# Patient Record
Sex: FEMALE | URBAN - METROPOLITAN AREA
[De-identification: names, ages, dates, MRNs, and addresses within clinical notes are randomized per-mention and may not be internally consistent; named-entity substitution may affect disease eponyms.]

---

## 2017-01-02 ENCOUNTER — EMERGENCY (EMERGENCY)
Facility: HOSPITAL | Age: 67
LOS: 1 days | Discharge: HOME | End: 2017-01-02
Attending: EMERGENCY MEDICINE
Payer: MEDICARE

## 2017-01-02 VITALS
DIASTOLIC BLOOD PRESSURE: 62 MMHG | RESPIRATION RATE: 16 BRPM | OXYGEN SATURATION: 97 % | SYSTOLIC BLOOD PRESSURE: 99 MMHG | TEMPERATURE: 98 F | HEIGHT: 64 IN | WEIGHT: 139.99 LBS | HEART RATE: 74 BPM

## 2017-01-02 DIAGNOSIS — S61.412A LACERATION WITHOUT FOREIGN BODY OF LEFT HAND, INITIAL ENCOUNTER: ICD-10-CM
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
## 2017-01-02 DIAGNOSIS — E78.00 PURE HYPERCHOLESTEROLEMIA, UNSPECIFIED: ICD-10-CM

## 2017-01-02 DIAGNOSIS — Z85.07 PERSONAL HISTORY OF MALIGNANT NEOPLASM OF PANCREAS: ICD-10-CM
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
## 2017-01-02 DIAGNOSIS — Z98.890 OTHER SPECIFIED POSTPROCEDURAL STATES: Chronic | ICD-10-CM

## 2017-01-02 DIAGNOSIS — Z87.19 PERSONAL HISTORY OF OTHER DISEASES OF THE DIGESTIVE SYSTEM: Chronic | ICD-10-CM

## 2017-01-02 DIAGNOSIS — E11.9 TYPE 2 DIABETES MELLITUS WITHOUT COMPLICATIONS: ICD-10-CM

## 2017-01-02 DIAGNOSIS — I10 ESSENTIAL (PRIMARY) HYPERTENSION: ICD-10-CM

## 2017-01-02 LAB
ALBUMIN SERPL ELPH-MCNC: 3.1 G/DL — LOW (ref 3.5–5)
ALP SERPL-CCNC: 152 U/L — HIGH (ref 40–120)
ALT FLD-CCNC: 29 U/L DA — SIGNIFICANT CHANGE UP (ref 10–60)
ANION GAP SERPL CALC-SCNC: 8 MMOL/L — SIGNIFICANT CHANGE UP (ref 5–17)
AST SERPL-CCNC: 14 U/L — SIGNIFICANT CHANGE UP (ref 10–40)
BILIRUB SERPL-MCNC: 0.2 MG/DL — SIGNIFICANT CHANGE UP (ref 0.2–1.2)
BUN SERPL-MCNC: 18 MG/DL — SIGNIFICANT CHANGE UP (ref 7–18)
CALCIUM SERPL-MCNC: 8.6 MG/DL — SIGNIFICANT CHANGE UP (ref 8.4–10.5)
CHLORIDE SERPL-SCNC: 109 MMOL/L — HIGH (ref 96–108)
CO2 SERPL-SCNC: 24 MMOL/L — SIGNIFICANT CHANGE UP (ref 22–31)
CREAT SERPL-MCNC: 1 MG/DL — SIGNIFICANT CHANGE UP (ref 0.5–1.3)
GLUCOSE SERPL-MCNC: 212 MG/DL — HIGH (ref 70–99)
HCT VFR BLD CALC: 34 % — LOW (ref 34.5–45)
HGB BLD-MCNC: 11.3 G/DL — LOW (ref 11.5–15.5)
MCHC RBC-ENTMCNC: 30 PG — SIGNIFICANT CHANGE UP (ref 27–34)
MCHC RBC-ENTMCNC: 33.2 GM/DL — SIGNIFICANT CHANGE UP (ref 32–36)
MCV RBC AUTO: 90.4 FL — SIGNIFICANT CHANGE UP (ref 80–100)
PLATELET # BLD AUTO: 204 K/UL — SIGNIFICANT CHANGE UP (ref 150–400)
POTASSIUM SERPL-MCNC: 4.6 MMOL/L — SIGNIFICANT CHANGE UP (ref 3.5–5.3)
POTASSIUM SERPL-SCNC: 4.6 MMOL/L — SIGNIFICANT CHANGE UP (ref 3.5–5.3)
PROT SERPL-MCNC: 6.4 G/DL — SIGNIFICANT CHANGE UP (ref 6–8.3)
RBC # BLD: 3.76 M/UL — LOW (ref 3.8–5.2)
RBC # FLD: 12.3 % — SIGNIFICANT CHANGE UP (ref 10.3–14.5)
SODIUM SERPL-SCNC: 141 MMOL/L — SIGNIFICANT CHANGE UP (ref 135–145)
WBC # BLD: 6.7 K/UL — SIGNIFICANT CHANGE UP (ref 3.8–10.5)
WBC # FLD AUTO: 6.7 K/UL — SIGNIFICANT CHANGE UP (ref 3.8–10.5)

## 2017-01-02 PROCEDURE — 85027 COMPLETE CBC AUTOMATED: CPT

## 2017-01-02 PROCEDURE — 36415 COLL VENOUS BLD VENIPUNCTURE: CPT

## 2017-01-02 PROCEDURE — 99284 EMERGENCY DEPT VISIT MOD MDM: CPT

## 2017-01-02 PROCEDURE — 80053 COMPREHEN METABOLIC PANEL: CPT

## 2017-01-02 PROCEDURE — 99283 EMERGENCY DEPT VISIT LOW MDM: CPT

## 2017-01-02 RX ORDER — CEPHALEXIN 500 MG
1 CAPSULE ORAL
Qty: 40 | Refills: 0 | OUTPATIENT
Start: 2017-01-02 | End: 2017-01-12

## 2017-01-02 NOTE — ED PROVIDER NOTE - NS ED MD SCRIBE ATTENDING SCRIBE SECTIONS
DISPOSITION/PAST MEDICAL/SURGICAL/SOCIAL HISTORY/REVIEW OF SYSTEMS/HISTORY OF PRESENT ILLNESS/PHYSICAL EXAM/VITAL SIGNS( Pullset)

## 2017-01-02 NOTE — ED PROVIDER NOTE - PROGRESS NOTE DETAILS
D/w pt need for delayed repair of wound given delayed presentation to ED. Pt understands, d/w pt relative who is physician in [place], aware of plan. f/u Hand referral, prophylactic abx.

## 2017-01-02 NOTE — ED ADULT NURSE NOTE - OBJECTIVE STATEMENT
axox3 ambulatory sustained bruising and laceration to Lt hand  onset yesterday no active bleeding noted last Tetanus UNKN

## 2017-01-02 NOTE — ED PROVIDER NOTE - PHYSICAL EXAMINATION
Skin: 2cm L thenar laceration Skin: 2cm L thenar laceration wound explored, no tendon involvement, fds/fdp/et intact left digits, cap refill <2

## 2017-01-02 NOTE — ED PROVIDER NOTE - OBJECTIVE STATEMENT
65 y/o F pt w/ PMHx of DM, pancreatic cancer and low blood pressure presents to the ED c/o laceration to L hand yesterday. Pt states that she was in the kitchen when she cut herself. Pt was bleeding profusely and associates weakness secondary to symptoms. Denies numbness/tingling, weakness or any other complaints. NKDA. 67 y/o F pt w/ PMHx of DM, pancreatic cancer and low blood pressure presents to the ED c/o laceration to L hand yesterday. Pt states that she was in the kitchen when she cut herself. Pt states cut was spurting blood yesterday evening. Denies numbness/tingling, weakness or any other complaints. NKDA.

## 2017-02-16 ENCOUNTER — APPOINTMENT (OUTPATIENT)
Dept: OPHTHALMOLOGY | Facility: CLINIC | Age: 67
End: 2017-02-16

## 2017-04-18 ENCOUNTER — RX RENEWAL (OUTPATIENT)
Age: 67
End: 2017-04-18

## 2017-04-20 ENCOUNTER — APPOINTMENT (OUTPATIENT)
Dept: OPHTHALMOLOGY | Facility: CLINIC | Age: 67
End: 2017-04-20

## 2017-05-18 ENCOUNTER — APPOINTMENT (OUTPATIENT)
Dept: OPHTHALMOLOGY | Facility: CLINIC | Age: 67
End: 2017-05-18

## 2017-06-15 ENCOUNTER — RX RENEWAL (OUTPATIENT)
Age: 67
End: 2017-06-15

## 2017-06-27 ENCOUNTER — APPOINTMENT (OUTPATIENT)
Dept: OPHTHALMOLOGY | Facility: CLINIC | Age: 67
End: 2017-06-27

## 2017-10-19 ENCOUNTER — APPOINTMENT (OUTPATIENT)
Dept: OPHTHALMOLOGY | Facility: CLINIC | Age: 67
End: 2017-10-19
Payer: MEDICARE

## 2017-10-19 PROCEDURE — 92012 INTRM OPH EXAM EST PATIENT: CPT

## 2018-03-14 ENCOUNTER — RX RENEWAL (OUTPATIENT)
Age: 68
End: 2018-03-14

## 2018-04-19 ENCOUNTER — APPOINTMENT (OUTPATIENT)
Dept: OPHTHALMOLOGY | Facility: CLINIC | Age: 68
End: 2018-04-19
Payer: MEDICARE

## 2018-04-19 PROCEDURE — 92012 INTRM OPH EXAM EST PATIENT: CPT

## 2018-10-18 ENCOUNTER — APPOINTMENT (OUTPATIENT)
Dept: OPHTHALMOLOGY | Facility: CLINIC | Age: 68
End: 2018-10-18
Payer: MEDICARE

## 2018-10-18 DIAGNOSIS — H10.45 OTHER CHRONIC ALLERGIC CONJUNCTIVITIS: ICD-10-CM

## 2018-10-18 DIAGNOSIS — Z96.1 PRESENCE OF INTRAOCULAR LENS: ICD-10-CM

## 2018-10-18 DIAGNOSIS — H20.023 RECURRENT ACUTE IRIDOCYCLITIS, BILATERAL: ICD-10-CM

## 2018-10-18 PROCEDURE — 92014 COMPRE OPH EXAM EST PT 1/>: CPT

## 2018-12-18 ENCOUNTER — INPATIENT (INPATIENT)
Facility: HOSPITAL | Age: 68
LOS: 2 days | Discharge: HOME | DRG: 390 | End: 2018-12-21
Attending: SURGERY | Admitting: SURGERY
Payer: MEDICARE

## 2018-12-18 VITALS
TEMPERATURE: 98 F | SYSTOLIC BLOOD PRESSURE: 150 MMHG | HEIGHT: 64 IN | WEIGHT: 151.9 LBS | HEART RATE: 65 BPM | RESPIRATION RATE: 16 BRPM | DIASTOLIC BLOOD PRESSURE: 97 MMHG | OXYGEN SATURATION: 98 %

## 2018-12-18 DIAGNOSIS — K56.609 UNSPECIFIED INTESTINAL OBSTRUCTION, UNSPECIFIED AS TO PARTIAL VERSUS COMPLETE OBSTRUCTION: ICD-10-CM

## 2018-12-18 DIAGNOSIS — Z98.890 OTHER SPECIFIED POSTPROCEDURAL STATES: Chronic | ICD-10-CM

## 2018-12-18 DIAGNOSIS — Z87.19 PERSONAL HISTORY OF OTHER DISEASES OF THE DIGESTIVE SYSTEM: Chronic | ICD-10-CM

## 2018-12-18 LAB
ALBUMIN SERPL ELPH-MCNC: 3.6 G/DL — SIGNIFICANT CHANGE UP (ref 3.5–5)
ALP SERPL-CCNC: 235 U/L — HIGH (ref 40–120)
ALT FLD-CCNC: 44 U/L DA — SIGNIFICANT CHANGE UP (ref 10–60)
ANION GAP SERPL CALC-SCNC: 12 MMOL/L — SIGNIFICANT CHANGE UP (ref 5–17)
APPEARANCE UR: CLEAR — SIGNIFICANT CHANGE UP
APTT BLD: 29.7 SEC — SIGNIFICANT CHANGE UP (ref 27.5–36.3)
AST SERPL-CCNC: 42 U/L — HIGH (ref 10–40)
BASOPHILS # BLD AUTO: 0.1 K/UL — SIGNIFICANT CHANGE UP (ref 0–0.2)
BASOPHILS NFR BLD AUTO: 0.6 % — SIGNIFICANT CHANGE UP (ref 0–2)
BILIRUB SERPL-MCNC: 0.6 MG/DL — SIGNIFICANT CHANGE UP (ref 0.2–1.2)
BILIRUB UR-MCNC: NEGATIVE — SIGNIFICANT CHANGE UP
BUN SERPL-MCNC: 17 MG/DL — SIGNIFICANT CHANGE UP (ref 7–18)
CALCIUM SERPL-MCNC: 8.8 MG/DL — SIGNIFICANT CHANGE UP (ref 8.4–10.5)
CHLORIDE SERPL-SCNC: 106 MMOL/L — SIGNIFICANT CHANGE UP (ref 96–108)
CO2 SERPL-SCNC: 21 MMOL/L — LOW (ref 22–31)
COLOR SPEC: YELLOW — SIGNIFICANT CHANGE UP
CREAT SERPL-MCNC: 0.92 MG/DL — SIGNIFICANT CHANGE UP (ref 0.5–1.3)
DIFF PNL FLD: NEGATIVE — SIGNIFICANT CHANGE UP
EOSINOPHIL # BLD AUTO: 0 K/UL — SIGNIFICANT CHANGE UP (ref 0–0.5)
EOSINOPHIL NFR BLD AUTO: 0.2 % — SIGNIFICANT CHANGE UP (ref 0–6)
GLUCOSE BLDC GLUCOMTR-MCNC: 128 MG/DL — HIGH (ref 70–99)
GLUCOSE BLDC GLUCOMTR-MCNC: 177 MG/DL — HIGH (ref 70–99)
GLUCOSE SERPL-MCNC: 172 MG/DL — HIGH (ref 70–99)
GLUCOSE UR QL: NEGATIVE — SIGNIFICANT CHANGE UP
HCT VFR BLD CALC: 38.2 % — SIGNIFICANT CHANGE UP (ref 34.5–45)
HGB BLD-MCNC: 12.1 G/DL — SIGNIFICANT CHANGE UP (ref 11.5–15.5)
INR BLD: 1.03 RATIO — SIGNIFICANT CHANGE UP (ref 0.88–1.16)
KETONES UR-MCNC: ABNORMAL
LEUKOCYTE ESTERASE UR-ACNC: NEGATIVE — SIGNIFICANT CHANGE UP
LIDOCAIN IGE QN: 30 U/L — LOW (ref 73–393)
LYMPHOCYTES # BLD AUTO: 1.5 K/UL — SIGNIFICANT CHANGE UP (ref 1–3.3)
LYMPHOCYTES # BLD AUTO: 15.8 % — SIGNIFICANT CHANGE UP (ref 13–44)
MCHC RBC-ENTMCNC: 26.6 PG — LOW (ref 27–34)
MCHC RBC-ENTMCNC: 31.7 GM/DL — LOW (ref 32–36)
MCV RBC AUTO: 83.7 FL — SIGNIFICANT CHANGE UP (ref 80–100)
MONOCYTES # BLD AUTO: 0.5 K/UL — SIGNIFICANT CHANGE UP (ref 0–0.9)
MONOCYTES NFR BLD AUTO: 5.2 % — SIGNIFICANT CHANGE UP (ref 2–14)
NEUTROPHILS # BLD AUTO: 7.4 K/UL — SIGNIFICANT CHANGE UP (ref 1.8–7.4)
NEUTROPHILS NFR BLD AUTO: 78.2 % — HIGH (ref 43–77)
NITRITE UR-MCNC: POSITIVE
PH UR: 5 — SIGNIFICANT CHANGE UP (ref 5–8)
PLATELET # BLD AUTO: 270 K/UL — SIGNIFICANT CHANGE UP (ref 150–400)
POTASSIUM SERPL-MCNC: 4.2 MMOL/L — SIGNIFICANT CHANGE UP (ref 3.5–5.3)
POTASSIUM SERPL-SCNC: 4.2 MMOL/L — SIGNIFICANT CHANGE UP (ref 3.5–5.3)
PROT SERPL-MCNC: 7.7 G/DL — SIGNIFICANT CHANGE UP (ref 6–8.3)
PROT UR-MCNC: 15
PROTHROM AB SERPL-ACNC: 11.4 SEC — SIGNIFICANT CHANGE UP (ref 10–12.9)
RBC # BLD: 4.57 M/UL — SIGNIFICANT CHANGE UP (ref 3.8–5.2)
RBC # FLD: 13.7 % — SIGNIFICANT CHANGE UP (ref 10.3–14.5)
SODIUM SERPL-SCNC: 139 MMOL/L — SIGNIFICANT CHANGE UP (ref 135–145)
SP GR SPEC: 1.02 — SIGNIFICANT CHANGE UP (ref 1.01–1.02)
TROPONIN I SERPL-MCNC: <0.015 NG/ML — SIGNIFICANT CHANGE UP (ref 0–0.04)
UROBILINOGEN FLD QL: NEGATIVE — SIGNIFICANT CHANGE UP
WBC # BLD: 9.5 K/UL — SIGNIFICANT CHANGE UP (ref 3.8–10.5)
WBC # FLD AUTO: 9.5 K/UL — SIGNIFICANT CHANGE UP (ref 3.8–10.5)

## 2018-12-18 PROCEDURE — 74177 CT ABD & PELVIS W/CONTRAST: CPT | Mod: 26

## 2018-12-18 PROCEDURE — 71045 X-RAY EXAM CHEST 1 VIEW: CPT | Mod: 26

## 2018-12-18 PROCEDURE — 99285 EMERGENCY DEPT VISIT HI MDM: CPT | Mod: 25

## 2018-12-18 PROCEDURE — 99222 1ST HOSP IP/OBS MODERATE 55: CPT

## 2018-12-18 RX ORDER — ONDANSETRON 8 MG/1
4 TABLET, FILM COATED ORAL ONCE
Qty: 0 | Refills: 0 | Status: COMPLETED | OUTPATIENT
Start: 2018-12-18 | End: 2018-12-18

## 2018-12-18 RX ORDER — DEXTROSE 50 % IN WATER 50 %
15 SYRINGE (ML) INTRAVENOUS ONCE
Qty: 0 | Refills: 0 | Status: DISCONTINUED | OUTPATIENT
Start: 2018-12-18 | End: 2018-12-21

## 2018-12-18 RX ORDER — CEFTRIAXONE 500 MG/1
1 INJECTION, POWDER, FOR SOLUTION INTRAMUSCULAR; INTRAVENOUS ONCE
Qty: 0 | Refills: 0 | Status: COMPLETED | OUTPATIENT
Start: 2018-12-18 | End: 2018-12-18

## 2018-12-18 RX ORDER — DEXTROSE 50 % IN WATER 50 %
12.5 SYRINGE (ML) INTRAVENOUS ONCE
Qty: 0 | Refills: 0 | Status: DISCONTINUED | OUTPATIENT
Start: 2018-12-18 | End: 2018-12-21

## 2018-12-18 RX ORDER — SODIUM CHLORIDE 9 MG/ML
1000 INJECTION INTRAMUSCULAR; INTRAVENOUS; SUBCUTANEOUS ONCE
Qty: 0 | Refills: 0 | Status: COMPLETED | OUTPATIENT
Start: 2018-12-18 | End: 2018-12-18

## 2018-12-18 RX ORDER — LEVOTHYROXINE SODIUM 125 MCG
100 TABLET ORAL
Qty: 0 | Refills: 0 | COMMUNITY

## 2018-12-18 RX ORDER — GLUCAGON INJECTION, SOLUTION 0.5 MG/.1ML
1 INJECTION, SOLUTION SUBCUTANEOUS ONCE
Qty: 0 | Refills: 0 | Status: DISCONTINUED | OUTPATIENT
Start: 2018-12-18 | End: 2018-12-21

## 2018-12-18 RX ORDER — IOHEXOL 300 MG/ML
30 INJECTION, SOLUTION INTRAVENOUS ONCE
Qty: 0 | Refills: 0 | Status: COMPLETED | OUTPATIENT
Start: 2018-12-18 | End: 2018-12-18

## 2018-12-18 RX ORDER — SODIUM CHLORIDE 9 MG/ML
1000 INJECTION INTRAMUSCULAR; INTRAVENOUS; SUBCUTANEOUS
Qty: 0 | Refills: 0 | Status: DISCONTINUED | OUTPATIENT
Start: 2018-12-18 | End: 2018-12-21

## 2018-12-18 RX ORDER — ENOXAPARIN SODIUM 100 MG/ML
40 INJECTION SUBCUTANEOUS DAILY
Qty: 0 | Refills: 0 | Status: DISCONTINUED | OUTPATIENT
Start: 2018-12-18 | End: 2018-12-21

## 2018-12-18 RX ORDER — LEVOTHYROXINE SODIUM 125 MCG
50 TABLET ORAL AT BEDTIME
Qty: 0 | Refills: 0 | Status: DISCONTINUED | OUTPATIENT
Start: 2018-12-18 | End: 2018-12-20

## 2018-12-18 RX ORDER — ACETAMINOPHEN 500 MG
1000 TABLET ORAL ONCE
Qty: 0 | Refills: 0 | Status: COMPLETED | OUTPATIENT
Start: 2018-12-18 | End: 2018-12-18

## 2018-12-18 RX ORDER — INSULIN LISPRO 100/ML
VIAL (ML) SUBCUTANEOUS
Qty: 0 | Refills: 0 | Status: DISCONTINUED | OUTPATIENT
Start: 2018-12-18 | End: 2018-12-21

## 2018-12-18 RX ORDER — ONDANSETRON 8 MG/1
4 TABLET, FILM COATED ORAL EVERY 6 HOURS
Qty: 0 | Refills: 0 | Status: DISCONTINUED | OUTPATIENT
Start: 2018-12-18 | End: 2018-12-21

## 2018-12-18 RX ORDER — FAMOTIDINE 10 MG/ML
20 INJECTION INTRAVENOUS ONCE
Qty: 0 | Refills: 0 | Status: COMPLETED | OUTPATIENT
Start: 2018-12-18 | End: 2018-12-18

## 2018-12-18 RX ORDER — SODIUM CHLORIDE 9 MG/ML
1000 INJECTION, SOLUTION INTRAVENOUS
Qty: 0 | Refills: 0 | Status: DISCONTINUED | OUTPATIENT
Start: 2018-12-18 | End: 2018-12-21

## 2018-12-18 RX ORDER — SODIUM CHLORIDE 9 MG/ML
3 INJECTION INTRAMUSCULAR; INTRAVENOUS; SUBCUTANEOUS ONCE
Qty: 0 | Refills: 0 | Status: COMPLETED | OUTPATIENT
Start: 2018-12-18 | End: 2018-12-18

## 2018-12-18 RX ORDER — DEXTROSE 50 % IN WATER 50 %
25 SYRINGE (ML) INTRAVENOUS ONCE
Qty: 0 | Refills: 0 | Status: DISCONTINUED | OUTPATIENT
Start: 2018-12-18 | End: 2018-12-21

## 2018-12-18 RX ORDER — MORPHINE SULFATE 50 MG/1
4 CAPSULE, EXTENDED RELEASE ORAL ONCE
Qty: 0 | Refills: 0 | Status: DISCONTINUED | OUTPATIENT
Start: 2018-12-18 | End: 2018-12-18

## 2018-12-18 RX ORDER — FLUOXETINE HCL 10 MG
10 CAPSULE ORAL DAILY
Qty: 0 | Refills: 0 | Status: DISCONTINUED | OUTPATIENT
Start: 2018-12-18 | End: 2018-12-21

## 2018-12-18 RX ORDER — PANTOPRAZOLE SODIUM 20 MG/1
40 TABLET, DELAYED RELEASE ORAL DAILY
Qty: 0 | Refills: 0 | Status: DISCONTINUED | OUTPATIENT
Start: 2018-12-18 | End: 2018-12-21

## 2018-12-18 RX ORDER — INSULIN GLARGINE 100 [IU]/ML
4 INJECTION, SOLUTION SUBCUTANEOUS AT BEDTIME
Qty: 0 | Refills: 0 | Status: DISCONTINUED | OUTPATIENT
Start: 2018-12-18 | End: 2018-12-20

## 2018-12-18 RX ORDER — BENZOCAINE AND MENTHOL 5; 1 G/100ML; G/100ML
1 LIQUID ORAL EVERY 6 HOURS
Qty: 0 | Refills: 0 | Status: DISCONTINUED | OUTPATIENT
Start: 2018-12-18 | End: 2018-12-21

## 2018-12-18 RX ORDER — METOPROLOL TARTRATE 50 MG
5 TABLET ORAL EVERY 6 HOURS
Qty: 0 | Refills: 0 | Status: DISCONTINUED | OUTPATIENT
Start: 2018-12-18 | End: 2018-12-20

## 2018-12-18 RX ADMIN — SODIUM CHLORIDE 2000 MILLILITER(S): 9 INJECTION INTRAMUSCULAR; INTRAVENOUS; SUBCUTANEOUS at 09:24

## 2018-12-18 RX ADMIN — Medication 50 MICROGRAM(S): at 23:19

## 2018-12-18 RX ADMIN — INSULIN GLARGINE 4 UNIT(S): 100 INJECTION, SOLUTION SUBCUTANEOUS at 22:04

## 2018-12-18 RX ADMIN — Medication 400 MILLIGRAM(S): at 18:59

## 2018-12-18 RX ADMIN — SODIUM CHLORIDE 125 MILLILITER(S): 9 INJECTION INTRAMUSCULAR; INTRAVENOUS; SUBCUTANEOUS at 05:15

## 2018-12-18 RX ADMIN — ENOXAPARIN SODIUM 40 MILLIGRAM(S): 100 INJECTION SUBCUTANEOUS at 13:10

## 2018-12-18 RX ADMIN — BENZOCAINE AND MENTHOL 1 LOZENGE: 5; 1 LIQUID ORAL at 16:52

## 2018-12-18 RX ADMIN — SODIUM CHLORIDE 125 MILLILITER(S): 9 INJECTION INTRAMUSCULAR; INTRAVENOUS; SUBCUTANEOUS at 13:30

## 2018-12-18 RX ADMIN — SODIUM CHLORIDE 1000 MILLILITER(S): 9 INJECTION INTRAMUSCULAR; INTRAVENOUS; SUBCUTANEOUS at 05:04

## 2018-12-18 RX ADMIN — CEFTRIAXONE 100 GRAM(S): 500 INJECTION, POWDER, FOR SOLUTION INTRAMUSCULAR; INTRAVENOUS at 09:19

## 2018-12-18 RX ADMIN — Medication 5 MILLIGRAM(S): at 18:58

## 2018-12-18 RX ADMIN — ONDANSETRON 4 MILLIGRAM(S): 8 TABLET, FILM COATED ORAL at 06:52

## 2018-12-18 RX ADMIN — Medication 1000 MILLIGRAM(S): at 19:14

## 2018-12-18 RX ADMIN — SODIUM CHLORIDE 1000 MILLILITER(S): 9 INJECTION INTRAMUSCULAR; INTRAVENOUS; SUBCUTANEOUS at 04:28

## 2018-12-18 RX ADMIN — MORPHINE SULFATE 4 MILLIGRAM(S): 50 CAPSULE, EXTENDED RELEASE ORAL at 13:11

## 2018-12-18 RX ADMIN — FAMOTIDINE 20 MILLIGRAM(S): 10 INJECTION INTRAVENOUS at 09:24

## 2018-12-18 RX ADMIN — IOHEXOL 30 MILLILITER(S): 300 INJECTION, SOLUTION INTRAVENOUS at 06:51

## 2018-12-18 RX ADMIN — PANTOPRAZOLE SODIUM 40 MILLIGRAM(S): 20 TABLET, DELAYED RELEASE ORAL at 23:18

## 2018-12-18 RX ADMIN — Medication 5 MILLIGRAM(S): at 23:50

## 2018-12-18 RX ADMIN — SODIUM CHLORIDE 3 MILLILITER(S): 9 INJECTION INTRAMUSCULAR; INTRAVENOUS; SUBCUTANEOUS at 04:28

## 2018-12-18 RX ADMIN — Medication 400 MILLIGRAM(S): at 06:50

## 2018-12-18 NOTE — ED PROVIDER NOTE - PROGRESS NOTE DETAILS
Patient signed out to me by Dr. Michelle. concern for SBO. Awaiting CT abdomen/pelvis Patient is resting comfortably, NAD. SBO on CT. Spoke with surgical PA. They will come down and insert NG tube. Patient is resting comfortably, NAD. Accepted to Dr. Winters's service

## 2018-12-18 NOTE — H&P ADULT - HISTORY OF PRESENT ILLNESS
66yo F PMH pancreatic ca s/p whipple procedure 1999 s/p chemo, has had SBO x 3-4 times, (most recent admission was to Dr Philippe in 2016 for the same issue) presents c/o nausea, vomiting and abdominal pain over the past 2 days worsened this morning. Patient had flatus and bowel movement this morning but noticed it was less than previously.  Pt states had SBO in past never requiring surgery; only conservative management with NGT.  No other complaints. no other surgeries.  NGT placed in ED returned 200ml.    currently c/o abd pain and requesting pain medication 66 yo F PMH pancreatic ca s/p whipple procedure 1999 s/p chemo, has had SBO x 3-4 times, (most recent admission was to Dr Philippe in 2016 for the same issue) presents c/o nausea, vomiting and abdominal pain over the past 2 days worsened this morning. Patient had flatus and bowel movement this morning but noticed it was less than previously.  Pt states had SBO in past never requiring surgery; only conservative management with NGT.  No other complaints. no other surgeries.  NGT placed in ED returned 200ml.    currently c/o abd pain and requesting pain medication

## 2018-12-18 NOTE — H&P ADULT - ASSESSMENT
66 yo female with multiple SBOs in the past, treated conservatively, presents with recurrent SBO    1- NGT placed in the ED to lws  2- npo/ivf  3- oob/ambulate often  4- continue home meds  5- admit to surgery, Dr. Winters  6- d/w Dr Winters and agrees

## 2018-12-18 NOTE — ED ADULT NURSE REASSESSMENT NOTE - NS ED NURSE REASSESS COMMENT FT1
Patient reported that abdominal pains has worsened and nausea has intensified. ED physician informed.
Patient seen by surgery NGT inserted to right nare with brown liquid return. Patient with IV hydration in progress , pain medication administered as ordered, awaiting bed.

## 2018-12-18 NOTE — ED PROVIDER NOTE - OBJECTIVE STATEMENT
Chief complaint of diffuse abdominal pain x 2 days, = retching and nausea. No fever, no chest pain, no shortness of breath.  S/P SBO 2016.  S/P Whipple's for pancreatic Ca 19 years ago. Chief complaint of diffuse abdominal pain x 2 days, + retching and nausea. No fever, no chest pain, no shortness of breath.  S/P SBO 2016.  S/P Whipple's for pancreatic Ca 19 years ago.

## 2018-12-18 NOTE — ED ADULT NURSE NOTE - CHPI ED NUR SYMPTOMS NEG
no dysuria/no hematuria/no fever/no diarrhea/no burning urination/no abdominal distension/no blood in stool/no chills

## 2018-12-18 NOTE — ED ADULT TRIAGE NOTE - CHIEF COMPLAINT QUOTE
BIBEMS from home, c/o diffused abdominal pain started last Saturday, accompanied by N/V.  Pt also c/o pleuritic CP, non radiating, denies any SOB

## 2018-12-18 NOTE — H&P ADULT - NSHPLABSRESULTS_GEN_ALL_CORE
12.1   9.5   )-----------( 270      ( 18 Dec 2018 04:31 )             38.2   12-18    139  |  106  |  17  ----------------------------<  172<H>  4.2   |  21<L>  |  0.92    Ca    8.8      18 Dec 2018 04:31    TPro  7.7  /  Alb  3.6  /  TBili  0.6  /  DBili  x   /  AST  42<H>  /  ALT  44  /  AlkPhos  235<H>  12-18    < from: CT Abdomen and Pelvis w/ Oral Cont and w/ IV Cont (12.18.18 @ 09:00) >    FINDINGS:    LOWER CHEST: Bibasilar atelectasis.    LIVER: Within normal limits.  BILE DUCTS: Pneumobilia is without significant change.  GALLBLADDER: Surgically absent.  SPLEEN: Within normal limits.  PANCREAS: Whipple resection. The remainder of the pancreas is atrophic.  ADRENALS: Within normal limits.  KIDNEYS/URETERS: No hydronephrosis. Stable hypoattenuating lesion in the   left upper pole.    BLADDER: Underdistended.  REPRODUCTIVE ORGANS: Fibroiduterus. No adnexal masses.    BOWEL: O contrast reaches the proximal small bowel loops which are   dilated to a transition point in the right upper quadrant, similar in   appearance dating back to 12/19/2016 and 10/15/2013. Findings are   consistent with small bowel obstruction.  Appendix within normal limits.  PERITONEUM: Small volume abdominal and pelvic free fluid. No   intraperitoneal free air.  VESSELS:  Normal caliber abdominal aorta with atherosclerotic disease.  RETROPERITONEUM: No lymphadenopathy.    ABDOMINAL WALL: Within normal limits.  BONES: Degenerative changes of the spine.    IMPRESSION:     Small bowel obstruction with transition point in the right upper   quadrant, similar in appearance dating back to 12/19/2016 and 10/15/2013.    Small volume abdominal and pelvic free fluid. No intraperitoneal free air.    < end of copied text >

## 2018-12-18 NOTE — H&P ADULT - NSHPPHYSICALEXAM_GEN_ALL_CORE
General: NAD, comfortable  Abd: softly distended. minimally tender in the mid abdomen. no gaurding rebound or rigidity

## 2018-12-18 NOTE — ED ADULT NURSE NOTE - NSIMPLEMENTINTERV_GEN_ALL_ED
Implemented All Universal Safety Interventions:  [place] to call system. Call bell, personal items and telephone within reach. Instruct patient to call for assistance. Room bathroom lighting operational. Non-slip footwear when patient is off stretcher. Physically safe environment: no spills, clutter or unnecessary equipment. Stretcher in lowest position, wheels locked, appropriate side rails in place.

## 2018-12-19 LAB
GLUCOSE BLDC GLUCOMTR-MCNC: 105 MG/DL — HIGH (ref 70–99)
GLUCOSE BLDC GLUCOMTR-MCNC: 143 MG/DL — HIGH (ref 70–99)
GLUCOSE BLDC GLUCOMTR-MCNC: 145 MG/DL — HIGH (ref 70–99)
GLUCOSE BLDC GLUCOMTR-MCNC: 87 MG/DL — SIGNIFICANT CHANGE UP (ref 70–99)

## 2018-12-19 PROCEDURE — 99233 SBSQ HOSP IP/OBS HIGH 50: CPT

## 2018-12-19 RX ORDER — ACETAMINOPHEN 500 MG
1000 TABLET ORAL ONCE
Qty: 0 | Refills: 0 | Status: COMPLETED | OUTPATIENT
Start: 2018-12-19 | End: 2018-12-19

## 2018-12-19 RX ORDER — METOCLOPRAMIDE HCL 10 MG
10 TABLET ORAL ONCE
Qty: 0 | Refills: 0 | Status: COMPLETED | OUTPATIENT
Start: 2018-12-19 | End: 2018-12-19

## 2018-12-19 RX ADMIN — Medication 400 MILLIGRAM(S): at 18:09

## 2018-12-19 RX ADMIN — Medication 400 MILLIGRAM(S): at 11:13

## 2018-12-19 RX ADMIN — Medication 50 MICROGRAM(S): at 22:33

## 2018-12-19 RX ADMIN — Medication 5 MILLIGRAM(S): at 18:11

## 2018-12-19 RX ADMIN — SODIUM CHLORIDE 125 MILLILITER(S): 9 INJECTION INTRAMUSCULAR; INTRAVENOUS; SUBCUTANEOUS at 20:55

## 2018-12-19 RX ADMIN — ENOXAPARIN SODIUM 40 MILLIGRAM(S): 100 INJECTION SUBCUTANEOUS at 11:15

## 2018-12-19 RX ADMIN — Medication 5 MILLIGRAM(S): at 05:20

## 2018-12-19 RX ADMIN — SODIUM CHLORIDE 125 MILLILITER(S): 9 INJECTION INTRAMUSCULAR; INTRAVENOUS; SUBCUTANEOUS at 05:20

## 2018-12-19 RX ADMIN — PANTOPRAZOLE SODIUM 40 MILLIGRAM(S): 20 TABLET, DELAYED RELEASE ORAL at 11:14

## 2018-12-19 RX ADMIN — Medication 10 MILLIGRAM(S): at 10:25

## 2018-12-19 RX ADMIN — Medication 5 MILLIGRAM(S): at 13:39

## 2018-12-19 RX ADMIN — ONDANSETRON 4 MILLIGRAM(S): 8 TABLET, FILM COATED ORAL at 05:48

## 2018-12-19 RX ADMIN — Medication 1000 MILLIGRAM(S): at 11:40

## 2018-12-19 RX ADMIN — Medication 1000 MILLIGRAM(S): at 19:00

## 2018-12-19 NOTE — PROGRESS NOTE ADULT - ASSESSMENT
67y old Female with small bowel obstruction with PMH Hypothyroidism, Pancreatic cancer - history of whipple, Essential hypertension, High cholesterol, Diabetes    - continue NPO with NGT decompression, monitor NGT output  - will follow up AM AXR results once completed  - IVF  - antiemetic PRN  - await bowel function

## 2018-12-20 LAB
ANION GAP SERPL CALC-SCNC: 15 MMOL/L — SIGNIFICANT CHANGE UP (ref 5–17)
BUN SERPL-MCNC: 9 MG/DL — SIGNIFICANT CHANGE UP (ref 7–18)
CALCIUM SERPL-MCNC: 8.1 MG/DL — LOW (ref 8.4–10.5)
CHLORIDE SERPL-SCNC: 107 MMOL/L — SIGNIFICANT CHANGE UP (ref 96–108)
CO2 SERPL-SCNC: 18 MMOL/L — LOW (ref 22–31)
CREAT SERPL-MCNC: 0.62 MG/DL — SIGNIFICANT CHANGE UP (ref 0.5–1.3)
GLUCOSE BLDC GLUCOMTR-MCNC: 105 MG/DL — HIGH (ref 70–99)
GLUCOSE BLDC GLUCOMTR-MCNC: 152 MG/DL — HIGH (ref 70–99)
GLUCOSE BLDC GLUCOMTR-MCNC: 196 MG/DL — HIGH (ref 70–99)
GLUCOSE BLDC GLUCOMTR-MCNC: 201 MG/DL — HIGH (ref 70–99)
GLUCOSE SERPL-MCNC: 102 MG/DL — HIGH (ref 70–99)
HCT VFR BLD CALC: 36.4 % — SIGNIFICANT CHANGE UP (ref 34.5–45)
HGB BLD-MCNC: 11.1 G/DL — LOW (ref 11.5–15.5)
MCHC RBC-ENTMCNC: 25.7 PG — LOW (ref 27–34)
MCHC RBC-ENTMCNC: 30.6 GM/DL — LOW (ref 32–36)
MCV RBC AUTO: 83.9 FL — SIGNIFICANT CHANGE UP (ref 80–100)
PLATELET # BLD AUTO: 228 K/UL — SIGNIFICANT CHANGE UP (ref 150–400)
POTASSIUM SERPL-MCNC: 3.2 MMOL/L — LOW (ref 3.5–5.3)
POTASSIUM SERPL-SCNC: 3.2 MMOL/L — LOW (ref 3.5–5.3)
RBC # BLD: 4.34 M/UL — SIGNIFICANT CHANGE UP (ref 3.8–5.2)
RBC # FLD: 14 % — SIGNIFICANT CHANGE UP (ref 10.3–14.5)
SODIUM SERPL-SCNC: 140 MMOL/L — SIGNIFICANT CHANGE UP (ref 135–145)
WBC # BLD: 9.2 K/UL — SIGNIFICANT CHANGE UP (ref 3.8–10.5)
WBC # FLD AUTO: 9.2 K/UL — SIGNIFICANT CHANGE UP (ref 3.8–10.5)

## 2018-12-20 PROCEDURE — 99233 SBSQ HOSP IP/OBS HIGH 50: CPT

## 2018-12-20 PROCEDURE — 74019 RADEX ABDOMEN 2 VIEWS: CPT | Mod: 26

## 2018-12-20 RX ORDER — LEVOTHYROXINE SODIUM 125 MCG
100 TABLET ORAL DAILY
Qty: 0 | Refills: 0 | Status: DISCONTINUED | OUTPATIENT
Start: 2018-12-20 | End: 2018-12-21

## 2018-12-20 RX ORDER — ATORVASTATIN CALCIUM 80 MG/1
20 TABLET, FILM COATED ORAL AT BEDTIME
Qty: 0 | Refills: 0 | Status: DISCONTINUED | OUTPATIENT
Start: 2018-12-20 | End: 2018-12-21

## 2018-12-20 RX ORDER — METOPROLOL TARTRATE 50 MG
25 TABLET ORAL
Qty: 0 | Refills: 0 | Status: DISCONTINUED | OUTPATIENT
Start: 2018-12-20 | End: 2018-12-21

## 2018-12-20 RX ORDER — ACETAMINOPHEN 500 MG
650 TABLET ORAL EVERY 6 HOURS
Qty: 0 | Refills: 0 | Status: DISCONTINUED | OUTPATIENT
Start: 2018-12-20 | End: 2018-12-21

## 2018-12-20 RX ADMIN — PANTOPRAZOLE SODIUM 40 MILLIGRAM(S): 20 TABLET, DELAYED RELEASE ORAL at 11:26

## 2018-12-20 RX ADMIN — ATORVASTATIN CALCIUM 20 MILLIGRAM(S): 80 TABLET, FILM COATED ORAL at 21:33

## 2018-12-20 RX ADMIN — Medication 650 MILLIGRAM(S): at 11:25

## 2018-12-20 RX ADMIN — ENOXAPARIN SODIUM 40 MILLIGRAM(S): 100 INJECTION SUBCUTANEOUS at 11:26

## 2018-12-20 RX ADMIN — Medication 10 MILLIGRAM(S): at 11:29

## 2018-12-20 RX ADMIN — Medication 2: at 17:21

## 2018-12-20 RX ADMIN — Medication 2: at 12:27

## 2018-12-20 RX ADMIN — SODIUM CHLORIDE 125 MILLILITER(S): 9 INJECTION INTRAMUSCULAR; INTRAVENOUS; SUBCUTANEOUS at 08:35

## 2018-12-20 RX ADMIN — Medication 650 MILLIGRAM(S): at 12:25

## 2018-12-20 RX ADMIN — Medication 5 MILLIGRAM(S): at 05:28

## 2018-12-20 RX ADMIN — Medication 5 MILLIGRAM(S): at 00:39

## 2018-12-20 NOTE — PROGRESS NOTE ADULT - ASSESSMENT
67y old Female with small bowel obstruction with PMH Hypothyroidism, Pancreatic cancer - history of whipple, Essential hypertension, High cholesterol, Diabetes    - NGT removed  - advance to clear liquid diet  - antiemetic PRN  - await bowel function   - OOB, encourage ambulation

## 2018-12-21 ENCOUNTER — TRANSCRIPTION ENCOUNTER (OUTPATIENT)
Age: 68
End: 2018-12-21

## 2018-12-21 VITALS
HEART RATE: 69 BPM | SYSTOLIC BLOOD PRESSURE: 128 MMHG | OXYGEN SATURATION: 97 % | DIASTOLIC BLOOD PRESSURE: 68 MMHG | RESPIRATION RATE: 17 BRPM | TEMPERATURE: 98 F

## 2018-12-21 LAB
ANION GAP SERPL CALC-SCNC: 8 MMOL/L — SIGNIFICANT CHANGE UP (ref 5–17)
BUN SERPL-MCNC: 6 MG/DL — LOW (ref 7–18)
CALCIUM SERPL-MCNC: 8.2 MG/DL — LOW (ref 8.4–10.5)
CHLORIDE SERPL-SCNC: 108 MMOL/L — SIGNIFICANT CHANGE UP (ref 96–108)
CO2 SERPL-SCNC: 26 MMOL/L — SIGNIFICANT CHANGE UP (ref 22–31)
CREAT SERPL-MCNC: 0.67 MG/DL — SIGNIFICANT CHANGE UP (ref 0.5–1.3)
GLUCOSE BLDC GLUCOMTR-MCNC: 155 MG/DL — HIGH (ref 70–99)
GLUCOSE BLDC GLUCOMTR-MCNC: 168 MG/DL — HIGH (ref 70–99)
GLUCOSE BLDC GLUCOMTR-MCNC: 188 MG/DL — HIGH (ref 70–99)
GLUCOSE BLDC GLUCOMTR-MCNC: 205 MG/DL — HIGH (ref 70–99)
GLUCOSE SERPL-MCNC: 187 MG/DL — HIGH (ref 70–99)
HCT VFR BLD CALC: 32.6 % — LOW (ref 34.5–45)
HGB BLD-MCNC: 10 G/DL — LOW (ref 11.5–15.5)
MCHC RBC-ENTMCNC: 26 PG — LOW (ref 27–34)
MCHC RBC-ENTMCNC: 30.7 GM/DL — LOW (ref 32–36)
MCV RBC AUTO: 84.6 FL — SIGNIFICANT CHANGE UP (ref 80–100)
PLATELET # BLD AUTO: 180 K/UL — SIGNIFICANT CHANGE UP (ref 150–400)
POTASSIUM SERPL-MCNC: 3.1 MMOL/L — LOW (ref 3.5–5.3)
POTASSIUM SERPL-SCNC: 3.1 MMOL/L — LOW (ref 3.5–5.3)
RBC # BLD: 3.86 M/UL — SIGNIFICANT CHANGE UP (ref 3.8–5.2)
RBC # FLD: 14 % — SIGNIFICANT CHANGE UP (ref 10.3–14.5)
SODIUM SERPL-SCNC: 142 MMOL/L — SIGNIFICANT CHANGE UP (ref 135–145)
WBC # BLD: 4.5 K/UL — SIGNIFICANT CHANGE UP (ref 3.8–10.5)
WBC # FLD AUTO: 4.5 K/UL — SIGNIFICANT CHANGE UP (ref 3.8–10.5)

## 2018-12-21 PROCEDURE — 71045 X-RAY EXAM CHEST 1 VIEW: CPT

## 2018-12-21 PROCEDURE — 96375 TX/PRO/DX INJ NEW DRUG ADDON: CPT

## 2018-12-21 PROCEDURE — 84484 ASSAY OF TROPONIN QUANT: CPT

## 2018-12-21 PROCEDURE — 99238 HOSP IP/OBS DSCHRG MGMT 30/<: CPT

## 2018-12-21 PROCEDURE — 85730 THROMBOPLASTIN TIME PARTIAL: CPT

## 2018-12-21 PROCEDURE — 93005 ELECTROCARDIOGRAM TRACING: CPT

## 2018-12-21 PROCEDURE — 99285 EMERGENCY DEPT VISIT HI MDM: CPT | Mod: 25

## 2018-12-21 PROCEDURE — 96374 THER/PROPH/DIAG INJ IV PUSH: CPT | Mod: XU

## 2018-12-21 PROCEDURE — 81001 URINALYSIS AUTO W/SCOPE: CPT

## 2018-12-21 PROCEDURE — 85610 PROTHROMBIN TIME: CPT

## 2018-12-21 PROCEDURE — 80053 COMPREHEN METABOLIC PANEL: CPT

## 2018-12-21 PROCEDURE — 85027 COMPLETE CBC AUTOMATED: CPT

## 2018-12-21 PROCEDURE — 80048 BASIC METABOLIC PNL TOTAL CA: CPT

## 2018-12-21 PROCEDURE — 83690 ASSAY OF LIPASE: CPT

## 2018-12-21 PROCEDURE — 74177 CT ABD & PELVIS W/CONTRAST: CPT

## 2018-12-21 PROCEDURE — 74019 RADEX ABDOMEN 2 VIEWS: CPT

## 2018-12-21 PROCEDURE — 82962 GLUCOSE BLOOD TEST: CPT

## 2018-12-21 RX ADMIN — Medication 650 MILLIGRAM(S): at 06:17

## 2018-12-21 RX ADMIN — ENOXAPARIN SODIUM 40 MILLIGRAM(S): 100 INJECTION SUBCUTANEOUS at 11:29

## 2018-12-21 RX ADMIN — Medication 4: at 11:30

## 2018-12-21 RX ADMIN — Medication 2: at 07:59

## 2018-12-21 RX ADMIN — Medication 650 MILLIGRAM(S): at 06:58

## 2018-12-21 RX ADMIN — Medication 10 MILLIGRAM(S): at 11:30

## 2018-12-21 RX ADMIN — SODIUM CHLORIDE 125 MILLILITER(S): 9 INJECTION INTRAMUSCULAR; INTRAVENOUS; SUBCUTANEOUS at 06:17

## 2018-12-21 RX ADMIN — Medication 100 MICROGRAM(S): at 06:17

## 2018-12-21 RX ADMIN — Medication 25 MILLIGRAM(S): at 06:17

## 2018-12-21 NOTE — PROGRESS NOTE ADULT - SUBJECTIVE AND OBJECTIVE BOX
ATTENDING MD NOTE, MEDICINE    S: no abdominal pain, no nausea or vomiting    /80 P 64 R 17 T 97.1    PERRLA    Neck- supp;e no JVD    Lungs - clear to p/a    Cor S1S2 RSR no S3S4    Abd- soft+BS    Ext-no ecc    WBC 4.4 H/H 10/35    P: upgrade diet, d/c as per surgery.
ATTENDING MD NOTE, MEDICINE    S: alert and oriented , NGT was removed, slight abdominal pain    /77 P65 R 18 T 98.1    PEERLA    Neck-supple, no JVD    Lungs- clear to p/a    Cor S1S2 RSR no S3S4    Abd- soft, slightly tender on palpation in ernesto-umbilical area    Ext -no ecc    Neuro0- no ecc    blood sugar 105    A: SBO, resolving , awaiting bowel function.
ATTENDING MD NOTE, MEDICINE.      65 year old female, admiited for recurrent SBO.    PMH: pancreatic CA, DM , Hypothyroidism.    C/o abdominal pain and nausea    /80  P 78  T 98.4  R 18    PERRLA    Neck- supple ,no JVD    Lungs- clear to p/a    Cor S1S2 RSR no S3S4    Abd- soft , tender oon palpation in mid-abdomen    Ext- no ECC    Neuro -no new focal deficits    WBC 9, H/H 12/38    CT of abd - SBO    A: Recurrent SBO, NGT, IV fluids. TFT's.
Patient seen and examined at bedside with no complaints.  Admits to flatus. Complaining of nausea and abdominal pain.     Vital Signs Last 24 Hrs  T(F): 98.9 (12-19-18 @ 05:31), Max: 98.9 (12-19-18 @ 05:31)  HR: 62 (12-19-18 @ 05:31)  BP: 152/71 (12-19-18 @ 05:31)  RR: 16 (12-19-18 @ 05:31)  SpO2: 95% (12-19-18 @ 05:31)  POCT Blood Glucose.: 145 mg/dL (19 Dec 2018 08:13)    GENERAL: Alert, NAD  CHEST/LUNG: respirations nonlabored  ABDOMEN: NGT in place, tube flushed at bedside and connected to LWS. Abdomen soft, mild distention.    I&O's Detail    18 Dec 2018 07:01  -  19 Dec 2018 07:00  --------------------------------------------------------  IN:    sodium chloride 0.9%.: 1375 mL  Total IN: 1375 mL    OUT:    Nasoenteral Tube: 450 mL  Total OUT: 450 mL    Total NET: 925 mL    LABS:                        12.1   9.5   )-----------( 270      ( 18 Dec 2018 04:31 )             38.2     12-18    139  |  106  |  17  ----------------------------<  172<H>  4.2   |  21<L>  |  0.92    Ca    8.8      18 Dec 2018 04:31    TPro  7.7  /  Alb  3.6  /  TBili  0.6  /  DBili  x   /  AST  42<H>  /  ALT  44  /  AlkPhos  235<H>  12-18    PT/INR - ( 18 Dec 2018 04:31 )   PT: 11.4 sec;   INR: 1.03 ratio       PTT - ( 18 Dec 2018 04:31 )  PTT:29.7 sec
Patient seen and examined at bedside.   Complaining of intermittent nausea.     Vital Signs Last 24 Hrs  T(F): 98.1 (12-20-18 @ 06:11), Max: 99.1 (12-19-18 @ 11:51)  HR: 76 (12-20-18 @ 06:11)  BP: 147/80 (12-20-18 @ 06:11)  RR: 16 (12-20-18 @ 06:11)  SpO2: 96% (12-20-18 @ 06:11)  POCT Blood Glucose.: 105 mg/dL (20 Dec 2018 07:59)    GENERAL: Alert, NAD  CHEST/LUNG: respirations nonlabored  ABDOMEN: NGT output minimal, removed at bedside. Abdomen soft, NT, minimal distention    I&O's Detail    19 Dec 2018 07:01  -  20 Dec 2018 07:00  --------------------------------------------------------  IN:  Total IN: 0 mL    OUT:    Nasoenteral Tube: 100 mL  Total OUT: 100 mL    Total NET: -100 mL    LABS:                        11.1   9.2   )-----------( 228      ( 20 Dec 2018 08:00 )             36.4     12-20    x   |  x   |  x   ----------------------------<  x   x    |  x   |  0.62
Pt seen at bedside  Patient is a 67y old  Female who presents with a chief complaint of abd pain, nausea, vomiting (21 Dec 2018 08:27)      INTERVAL HPI/OVERNIGHT EVENTS:  Pt states feels well this AM  Tolerating clear liquids.   Denies fever, chills  +flatus/BM    Vital Signs Last 24 Hrs  T(C): 36.7 (21 Dec 2018 05:27), Max: 37 (20 Dec 2018 21:42)  T(F): 98 (21 Dec 2018 05:27), Max: 98.6 (20 Dec 2018 21:42)  HR: 65 (21 Dec 2018 05:27) (65 - 70)  BP: 141/71 (21 Dec 2018 05:27) (131/62 - 143/75)  BP(mean): --  RR: 16 (21 Dec 2018 05:27) (16 - 17)  SpO2: 97% (21 Dec 2018 05:27) (96% - 97%)    Physical Exam:    Gen: awake, alert oriented NAD  HEENT: anicteric  Abd: soft NT ND    MEDICATIONS  (STANDING):  atorvastatin 20 milliGRAM(s) Oral at bedtime  dextrose 5%. 1000 milliLiter(s) (50 mL/Hr) IV Continuous <Continuous>  dextrose 50% Injectable 12.5 Gram(s) IV Push once  dextrose 50% Injectable 25 Gram(s) IV Push once  dextrose 50% Injectable 25 Gram(s) IV Push once  enoxaparin Injectable 40 milliGRAM(s) SubCutaneous daily  FLUoxetine 10 milliGRAM(s) Oral daily  insulin lispro (HumaLOG) corrective regimen sliding scale   SubCutaneous three times a day before meals  levothyroxine 100 MICROGram(s) Oral daily  metoprolol tartrate 25 milliGRAM(s) Oral two times a day  pantoprazole  Injectable 40 milliGRAM(s) IV Push daily  sodium chloride 0.9%. 1000 milliLiter(s) (125 mL/Hr) IV Continuous <Continuous>    MEDICATIONS  (PRN):  acetaminophen   Tablet .. 650 milliGRAM(s) Oral every 6 hours PRN Mild Pain (1 - 3)  benzocaine 15 mG/menthol 3.6 mG Lozenge 1 Lozenge Oral every 6 hours PRN Sore Throat  dextrose 40% Gel 15 Gram(s) Oral once PRN Blood Glucose LESS THAN 70 milliGRAM(s)/deciliter  glucagon  Injectable 1 milliGRAM(s) IntraMuscular once PRN Glucose LESS THAN 70 milligrams/deciliter  ondansetron Injectable 4 milliGRAM(s) IV Push every 6 hours PRN Nausea                            10.0   4.5   )-----------( 180      ( 21 Dec 2018 07:33 )             32.6     12-21    142  |  108  |  6<L>  ----------------------------<  187<H>  3.1<L>   |  26  |  0.67    Ca    8.2<L>      21 Dec 2018 07:33

## 2018-12-21 NOTE — DISCHARGE NOTE ADULT - MEDICATION SUMMARY - MEDICATIONS TO TAKE
I will START or STAY ON the medications listed below when I get home from the hospital:    ibuprofen  --  by mouth   -- Indication: For as prescribed    PROzac  --  by mouth   -- Indication: For as prescribed    Glumetza  --  by mouth   -- Indication: For as prescribed    Levemir FlexPen 100 units/mL subcutaneous solution  -- 7 unit(s) subcutaneous once a day (at bedtime)  -- Indication: For as prescribed    NovoLOG FlexPen 100 units/mL subcutaneous solution  -- 6 unit(s) subcutaneous once a day  -- Indication: For as prescribed    atorvastatin  --  by mouth   -- Indication: For as prescribed    metoprolol  --  by mouth   -- Indication: For as prescribed    Provigil  --  by mouth   -- Indication: For as prescribed    Creon  --  by mouth   -- Indication: For as prescribed    Dexilant  --  by mouth   -- Indication: For as prescribed    buPROPion  --  by mouth   -- Indication: For as prescribed    Synthroid  -- 100 microgram(s) by mouth once a day  -- Indication: For as prescribed    Calcium 600+D  --  by mouth   -- Indication: For as prescribed

## 2018-12-21 NOTE — PROGRESS NOTE ADULT - REASON FOR ADMISSION
abd pain, nausea, vomitting
abd pain, nausea, vomiting
abd pain, nausea, vomitting
abd pain, nausea, vomitting

## 2018-12-21 NOTE — DISCHARGE NOTE ADULT - PLAN OF CARE
resolve small bowel obstruction, resume normal diet and activities f/u with Dr. Winters in 1 week. F/u with PCP

## 2018-12-21 NOTE — DISCHARGE NOTE ADULT - CARE PLAN
Principal Discharge DX:	Small bowel obstruction  Goal:	resolve small bowel obstruction, resume normal diet and activities  Assessment and plan of treatment:	f/u with Dr. Winters in 1 week. F/u with PCP

## 2018-12-21 NOTE — DISCHARGE NOTE ADULT - HOSPITAL COURSE
66yo F PMH pancreatic ca s/p whipple procedure 1999 s/p chemo, has had SBO x 3-4 times, (most recent admission was to Dr Philippe in 2016 for the same issue) presents c/o nausea, vomiting and abdominal pain over the past 2 days worsened this morning. Patient had flatus and bowel movement this morning but noticed it was less than previously.  Pt states had SBO in past never requiring surgery; only conservative management with NGT.  No other complaints. no other surgeries.  NGT placed in ED returned 200ml.    Pt clinically improved with conservative management. Pt is now stable for discharge home.

## 2018-12-21 NOTE — DISCHARGE NOTE ADULT - CARE PROVIDER_API CALL
Jonathan Winters (MD), Surgery  [address], [place] [id number]  Phone: [phone]  Fax: [phone]

## 2018-12-21 NOTE — DISCHARGE NOTE ADULT - OTHER SIGNIFICANT FINDINGS
< from: CT Abdomen and Pelvis w/ Oral Cont and w/ IV Cont (12.18.18 @ 09:00) >  EXAM:  CT ABDOMEN AND PELVIS OC IC                            PROCEDURE DATE:  12/18/2018          INTERPRETATION:  CLINICAL INFORMATION: Diffuse abdominal pain with   history of small bowel obstruction and Whipple's procedure.     COMPARISON: CT abdomen and pelvis 12/19/2016 and 10/15/2013.    PROCEDURE:   CT of the Abdomen and Pelvis was performed with intravenous contrast.   Intravenous contrast: 90 ml Omnipaque 350. 10 ml discarded.  Oral contrast: positive contrast was administered.  Sagittal and coronal reformats were performed.    FINDINGS:    LOWER CHEST: Bibasilar atelectasis.    LIVER: Within normal limits.  BILE DUCTS: Pneumobilia is without significant change.  GALLBLADDER: Surgically absent.  SPLEEN: Within normal limits.  PANCREAS: Whipple resection. The remainder of the pancreas is atrophic.  ADRENALS: Within normal limits.  KIDNEYS/URETERS: No hydronephrosis. Stable hypoattenuating lesion in the   left upper pole.    BLADDER: Underdistended.  REPRODUCTIVE ORGANS: Fibroiduterus. No adnexal masses.    BOWEL: O contrast reaches the proximal small bowel loops which are   dilated to a transition point in the right upper quadrant, similar in   appearance dating back to 12/19/2016 and 10/15/2013. Findings are   consistent with small bowel obstruction.  Appendix within normal limits.  PERITONEUM: Small volume abdominal and pelvic free fluid. No   intraperitoneal free air.  VESSELS:  Normal caliber abdominal aorta with atherosclerotic disease.  RETROPERITONEUM: No lymphadenopathy.    ABDOMINAL WALL: Within normal limits.  BONES: Degenerative changes of the spine.    IMPRESSION:     Small bowel obstruction with transition point in the right upper   quadrant, similar in appearance dating back to 12/19/2016 and 10/15/2013.    Small volume abdominal and pelvic free fluid. No intraperitoneal free air.    < end of copied text >

## 2019-01-07 NOTE — PATIENT PROFILE ADULT - HOW PATIENT ADDRESSED, PROFILE
PT ACUTE  Treatment Session          Pt seen on 4 LM nursing unit. Frequency Comments: M T Th F    RECOMMENDATIONS FOR DISCHARGE:  Recommendation for Discharge: PT: Post acute therapy;Sub-acute nursing home(IRP denied) (01/07/19 1040)                                                                                                                 Admitting complaint: SPINE METASTASIS                                     Precautions  Back Brace: Yes; When out of bed (01/07/19 1040)  Lumbar Precautions: Yes (01/07/19 1040)  Precautions Comments: fall risk (01/04/19 1420)    SUBJECTIVE:    Subjective: Pt in agreement of therapy. Pt reports lightheadness with standing. (01/07/19 1040)  Subjective/Objective Comments: Chart reviewed. Kristen Russo RN ok'ed therapy and informed of pt's c/o lightheadness and diaphoresis during session. Pt return to bed at end of session per pt request with bed alarm intact. White board adjusted at end of session. (01/07/19 1040)    OBJECTIVE:  Basic Lines: Capped IV; [place] catheter (01/07/19 1040)  Safety Measures: Alarms (01/07/19 1040)    RN reported Dino Shearing Fall Scale Score: 60    ASSESSMENT:   Pt demonstrates lightheadness with all transfers/gait trials with attempts x3 for mobility during session. Significant rest breaks between all mobility trials with lightheadedness resolving in sitting. Pt with reporting diaphoresis during last trial with return to bed. Pt progressing to touch assist for balance with transfers and short distance mobility up to 15 ft and supervision for bed mobility with increased time to complete. Discussion on brace management with min assist provided. Pt would benefit from continued therapy to progress safe mobility. Kristen Russo RN aware of lightheaded during session. Pt will require RAHEL placement for increased safety prior to return home.              EDUCATION:   On this date, the patient was educated on role of PT, ww and brace management, safety with mobility and continued POC. The response to education was: Nancy Chaparro understanding, Demonstrates understanding and Needs reinforcement    PT Identified Barriers to Discharge: medical, lack of D/C destination     PLAN:   Continue skilled PT, including the following Treatment/Interventions: Functional transfer training;Strengthening; Endurance training;Bed mobility;Gait training;Stairs retraining; Safety Education; Neuromuscular re-education (01/01/19 1315)   Frequency Comments: M T Th F (01/07/19 1040)    Treatment Plan for Next Session: bed mobility with log roll technique, transfer training, gait assessment with 2ww and chair follow, review brace and lumbar precautions  Additional Plan Considerations: monitor BP with mobility       RECOMMENDATIONS FOR DISCHARGE:  Recommendation for Discharge: PT: Post acute therapy;Sub-acute nursing home(IRP denied) (01/07/19 1040)    PT/OT Mobility Equipment for Discharge: Continue to assess (01/07/19 1040)  PT/OT ADL Equipment for Discharge: Continue to assess (01/07/19 0606)     Assistance needed when returning home:   Discussed plan for patient to discharge to [facility] for ongoing rehabilitation due to ongoing functional limitations      ICU Mobility Assesment (PERME):       Last 24 hours of Functional Data  Bed Mobility   Bed Mobility  Sit to Supine: Supervision Cuauhtemoc Moyer) (01/07/19 1040)  Bed Mobility Comments: Mod cues for positioning and technqiue. Supervision for safety. (01/07/19 1040)    Transfers  Transfers  Sit to Stand: Touching/Steadying Assistance (01/07/19 1040)  Stand to Sit: Touching/Steadying Assistance (01/07/19 1040)  Assistive Device/: 1 Person;Gait Belt;2-wheeled walker (01/07/19 1040)  Transfer Comments 1: Pt progressing to touch assist for balance and safety. Verbal cues for alignment. Pt limited by increased dizziness with standing trials.   (01/07/19 1040)      Gait  Gait  Gait Assistance: Touching/Steadying Assistance (01/07/19 1040)  Assistive Device/: 2-wheeled walker;1 Person;Gait Belt (01/07/19 1040)  Ambulation Distance (Feet): 15 Feet(5 and 10) (01/07/19 1040)  Pattern: Shuffle (01/07/19 1040)  Ambulation Surface: Tile (01/07/19 1040)  Gait Comments 1: Short distance ambulation performed from bathroom to bed to recliner with short distance mobility trial prior to return to bed. (01/07/19 1040),      Stairs          Neuromuscular Re-education       Balance  Balance  Sitting - Static: Modified Independent (01/07/19 1040)  Sitting - Dynamic: Modified Independent (01/07/19 1040)  Standing - Static: Supervision (Supv) (01/07/19 1040)  Standing - Dynamic (eyes open): Minimal Assist (Min) (01/07/19 1040)  Balance Comments #1: Supervision for static standing and min assist for dynamic movements (01/07/19 1040)    Wheelchair Mobility       Patient's Personal Goal: decrease pain (01/01/19 1315)    Therapy Goals:    Goals  Short Term Goals to Be Reviewed On: 01/14/19 (01/07/19 1040)  Short Term Goals = Discharge Goals: No (01/07/19 1040)  Goal Agreement: Patient agrees with goals and treatment plan (01/07/19 1040)  Bed Mobility Short Term Goal: Met - supine <> sit with min assist of 1 (01/07/19 1040)  Bed Mobility Discharge Goal: supine <> sit with modified independence (01/07/19 1040)  Bed Mobility Discharge Goal Progress: Outcome met, continue evaluating goal progress toward completion (01/07/19 1040)  Transfer Short Term Goal: Met- sit <> stand with 2ww and min assist (01/07/19 1040)  Transfer Discharge Goal: sit <> stand with 2ww with modified independence (01/07/19 1040)  Transfer Discharge Goal Progress: Outcome met, continue evaluating goal progress toward completion (01/07/19 1040)  Ambulation Short Term Goal: Met- 50ft with 2ww and mod assist of 1 (01/07/19 1040)  Ambulation Discharge Goal: 150ft with 2ww and modified independence (01/07/19 1040)  Ambulation Discharge Goal Progress:  Outcome met, continue evaluating goal progress toward completion (01/07/19 1040)  Stairs Short Term Goal: 1 step with 2 rails with mod assist of 1 (01/07/19 1040)  Stairs Discharge Goal: 1 stoop step with 2ww with modified independence (01/07/19 1040)  Stairs Discharge Goal Progress: Outcome not met, continue to monitor (01/07/19 1040)  Goals Comments: Goals revsied for upcoming week (01/07/19 1040)        PT Time Spent: 40 minutes (01/07/19 1040)    See PT flowsheet for full details regarding the PT therapy provided. Last name

## 2019-02-01 ENCOUNTER — OUTPATIENT (OUTPATIENT)
Dept: OUTPATIENT SERVICES | Facility: HOSPITAL | Age: 69
LOS: 1 days | End: 2019-02-01
Payer: MEDICARE

## 2019-02-01 DIAGNOSIS — Z87.19 PERSONAL HISTORY OF OTHER DISEASES OF THE DIGESTIVE SYSTEM: Chronic | ICD-10-CM

## 2019-02-01 DIAGNOSIS — Z98.890 OTHER SPECIFIED POSTPROCEDURAL STATES: Chronic | ICD-10-CM

## 2019-02-01 PROCEDURE — G9001: CPT

## 2019-02-05 ENCOUNTER — EMERGENCY (EMERGENCY)
Facility: HOSPITAL | Age: 69
LOS: 1 days | Discharge: HOME | End: 2019-02-05
Attending: EMERGENCY MEDICINE
Payer: MEDICARE

## 2019-02-05 VITALS
HEART RATE: 76 BPM | OXYGEN SATURATION: 99 % | DIASTOLIC BLOOD PRESSURE: 66 MMHG | HEIGHT: 66 IN | WEIGHT: 149.91 LBS | TEMPERATURE: 98 F | SYSTOLIC BLOOD PRESSURE: 100 MMHG | RESPIRATION RATE: 20 BRPM

## 2019-02-05 DIAGNOSIS — Z87.19 PERSONAL HISTORY OF OTHER DISEASES OF THE DIGESTIVE SYSTEM: Chronic | ICD-10-CM

## 2019-02-05 DIAGNOSIS — Z98.890 OTHER SPECIFIED POSTPROCEDURAL STATES: Chronic | ICD-10-CM

## 2019-02-05 PROCEDURE — 73130 X-RAY EXAM OF HAND: CPT | Mod: 26,RT

## 2019-02-05 PROCEDURE — 73610 X-RAY EXAM OF ANKLE: CPT | Mod: 26,RT

## 2019-02-05 PROCEDURE — 73130 X-RAY EXAM OF HAND: CPT

## 2019-02-05 PROCEDURE — 99284 EMERGENCY DEPT VISIT MOD MDM: CPT | Mod: 25

## 2019-02-05 PROCEDURE — 99284 EMERGENCY DEPT VISIT MOD MDM: CPT

## 2019-02-05 PROCEDURE — 73610 X-RAY EXAM OF ANKLE: CPT

## 2019-02-05 NOTE — ED PROVIDER NOTE - OBJECTIVE STATEMENT
69 y/o F pt with a PMHx of DM, HTN, HLD, and a significant PSHx of SBO, pancreatic, presents to the ED with complaints of right ankle pain. Patient reports she tripped and fell twisting her right ankle. Patient reports pain on the lateral aspect of the foot and notes right hand pain. Patient denies numbness, tingling or any other complaints. NKDA.

## 2019-02-05 NOTE — ED ADULT NURSE NOTE - NSIMPLEMENTINTERV_GEN_ALL_ED
Implemented All Fall Risk Interventions:  [facility] to call system. Call bell, personal items and telephone within reach. Instruct patient to call for assistance. Room bathroom lighting operational. Non-slip footwear when patient is off stretcher. Physically safe environment: no spills, clutter or unnecessary equipment. Stretcher in lowest position, wheels locked, appropriate side rails in place. Provide visual cue, wrist band, yellow gown, etc. Monitor gait and stability. Monitor for mental status changes and reorient to person, place, and time. Review medications for side effects contributing to fall risk. Reinforce activity limits and safety measures with patient and family.

## 2019-02-05 NOTE — ED ADULT NURSE NOTE - OBJECTIVE STATEMENT
AOX3 difficulty ambulating on the right leg complaining of right ankle pain. as per patient she twisted her ankle + tenderness +joint swelling denies any numbness or tingling sensations

## 2019-02-06 PROBLEM — E03.9 HYPOTHYROIDISM, UNSPECIFIED: Chronic | Status: ACTIVE | Noted: 2018-12-18

## 2019-02-08 DIAGNOSIS — Z71.89 OTHER SPECIFIED COUNSELING: ICD-10-CM

## 2019-02-21 ENCOUNTER — OUTPATIENT (OUTPATIENT)
Dept: OUTPATIENT SERVICES | Facility: HOSPITAL | Age: 69
LOS: 1 days | End: 2019-02-21
Payer: MEDICARE

## 2019-02-21 ENCOUNTER — APPOINTMENT (OUTPATIENT)
Dept: PODIATRY | Facility: CLINIC | Age: 69
End: 2019-02-21

## 2019-02-21 VITALS
HEART RATE: 72 BPM | BODY MASS INDEX: 25.61 KG/M2 | TEMPERATURE: 98 F | WEIGHT: 150 LBS | SYSTOLIC BLOOD PRESSURE: 118 MMHG | DIASTOLIC BLOOD PRESSURE: 76 MMHG | OXYGEN SATURATION: 99 % | HEIGHT: 64 IN | RESPIRATION RATE: 18 BRPM

## 2019-02-21 DIAGNOSIS — Z98.890 OTHER SPECIFIED POSTPROCEDURAL STATES: Chronic | ICD-10-CM

## 2019-02-21 DIAGNOSIS — Z87.19 PERSONAL HISTORY OF OTHER DISEASES OF THE DIGESTIVE SYSTEM: Chronic | ICD-10-CM

## 2019-02-21 DIAGNOSIS — Z00.00 ENCOUNTER FOR GENERAL ADULT MEDICAL EXAMINATION WITHOUT ABNORMAL FINDINGS: ICD-10-CM

## 2019-02-21 PROCEDURE — G0463: CPT

## 2019-02-21 NOTE — END OF VISIT
[] : Resident [FreeTextEntry3] : HAV left foot \par right ankle sprain - neg xrays \par sent and given rx for physical therapy \par ptr 2 weeks \par can consder mri as pt relates instabilty of years \par

## 2019-02-21 NOTE — ASSESSMENT
[FreeTextEntry1] : USHA:\par Vasc: DP/PT pulses palpable, mild edema noted to BLE\par Derm: corn noted to Left medial 2nd digit PIPJ, no interdigital macerations noted\par Neuro: Protective sensation diminished to the level of digits\par MSK: POP to lateral ankle BL, active and passive ROM x 4 quadrants\par \par A:\par HAV left foot \par right ankle sprain - neg xrays \par \par P:\par Patient evaluated, chart reviewed\par right ankle xrays reviewed\par Prescription given for PT for 1 month\par Can consider MRI in the future if no improvement seen with PT\par ACE applied to Right foot/ankle\par Prescription given for DM shoes\par RTC 2 weeks

## 2019-02-21 NOTE — HISTORY OF PRESENT ILLNESS
[FreeTextEntry1] : HPI: 68 year old Female complains of right ankle sprain on 2/5. States pain has improved but states she has instability in both ankles. States she has a bunion on the Left which has been bothering her for years but unsure if she would like to pursue surgical intervention. Pt is requesting DM shoes.  \par \par PMH:DM, hyperthyroidism/hypothyroidism, scoliosis\par PSH: surgery for pancreatic cancer\par Social Hx: Denies tobacco, alcohol and illicit drug use

## 2019-02-22 DIAGNOSIS — S93.401A SPRAIN OF UNSPECIFIED LIGAMENT OF RIGHT ANKLE, INITIAL ENCOUNTER: ICD-10-CM

## 2019-02-28 ENCOUNTER — RX RENEWAL (OUTPATIENT)
Age: 69
End: 2019-02-28

## 2019-03-20 NOTE — ED PROVIDER NOTE - CHIEF COMPLAINT
The patient is a 67y Female complaining of abdominal pain.
Pt would like to be restored to prior level of function and receive rehab services at home post-operatively.

## 2019-10-22 ENCOUNTER — APPOINTMENT (OUTPATIENT)
Dept: OPHTHALMOLOGY | Facility: CLINIC | Age: 69
End: 2019-10-22
Payer: MEDICARE

## 2019-10-22 ENCOUNTER — NON-APPOINTMENT (OUTPATIENT)
Age: 69
End: 2019-10-22

## 2019-10-22 PROCEDURE — 92014 COMPRE OPH EXAM EST PT 1/>: CPT

## 2020-08-13 ENCOUNTER — NON-APPOINTMENT (OUTPATIENT)
Age: 70
End: 2020-08-13

## 2020-08-13 ENCOUNTER — APPOINTMENT (OUTPATIENT)
Dept: OPHTHALMOLOGY | Facility: CLINIC | Age: 70
End: 2020-08-13
Payer: MEDICARE

## 2020-08-13 PROCEDURE — 92014 COMPRE OPH EXAM EST PT 1/>: CPT | Mod: CS

## 2020-08-13 PROCEDURE — 92134 CPTRZ OPH DX IMG PST SGM RTA: CPT

## 2021-04-21 NOTE — ED PROVIDER NOTE - GASTROINTESTINAL, MLM
Refill request from pharmacy for the medication listed below.  Patient was last seen 12-15-20.  Next appt 6-16-21.      Last written as  1-25-21  # 90 with no refills          Pharmacy:  [facility]        Drug Prescription Monitoring    Associated Diagnosis for Medication:  [place] PDMP Reviewed:  Date of Last Urine Drug Screen:  (If + for illegal substances do not fill; if negative for expected opiate do not fill)  Last Dispensed from Pharmacy:  New Refill Start Date Expected:  Next Refill After This Not Until:       Abdomen soft, non-tender, no guarding.

## 2021-06-23 NOTE — ED PROVIDER NOTE - GASTROINTESTINAL [+], MLM
RN at bedside pt is stating her pain is a 10/10 visibly breathing through contractions. Pt states she is unable to urinate and she \"feels like something is blocking it. \" IV morphine given for pain and Dr Igor Lind updated. ABDOMINAL PAIN/VOMITING/NAUSEA

## 2021-07-28 NOTE — ED ADULT TRIAGE NOTE - PRO INTERPRETER NEED 2
Voltaren       Last Written Prescription Date:  6/17/2021  Last Fill Quantity: 60,   # refills: 0  Last Office Visit: 7/22/2021  Future Office visit:    Next 5 appointments (look out 90 days)    Oct 25, 2021  1:30 PM  (Arrive by 1:15 PM)  SHORT with Lissy Moore MD  [facility] [place] ([facility] [place] ) [address]  [place] [address]  [phone]             
English

## 2021-08-13 ENCOUNTER — NON-APPOINTMENT (OUTPATIENT)
Age: 71
End: 2021-08-13

## 2021-08-13 ENCOUNTER — APPOINTMENT (OUTPATIENT)
Dept: OPHTHALMOLOGY | Facility: CLINIC | Age: 71
End: 2021-08-13
Payer: MEDICARE

## 2021-08-13 PROCEDURE — 92134 CPTRZ OPH DX IMG PST SGM RTA: CPT

## 2021-08-13 PROCEDURE — 92014 COMPRE OPH EXAM EST PT 1/>: CPT

## 2022-02-22 ENCOUNTER — RX RENEWAL (OUTPATIENT)
Age: 72
End: 2022-02-22

## 2022-03-11 NOTE — PATIENT PROFILE ADULT - NSPROPOAPRESSUREINJURY_GEN_A_NUR
5th floor RN to call back and state ok to send patient to 5th floor per Dr. Suzan Palmer.
Attempted to call report; was told to call back in about 10 mins.
Patient care report in progress to 5th floor RN. Nurse reported she wanted to check with Dr. Yamileth Ferrell to make sure patient was going to the appropriate floor. Will call back.
no

## 2022-05-01 NOTE — H&P ADULT - NSHPRISKHEPCSCREEN_GEN_A_CORE
Labs with no significant acute abnormalities noted with the exception of     -Vitamin D, increase daily Vit D to 2000 units daily, get over counter if not covered by insurance    Healthy diet with low carbs, salt, and fat intake. Exercise with goal at least 150 minutes of moderate exercise a week, such as fast walking, and 2 days of strength training.  Offered and patient declined

## 2022-08-12 ENCOUNTER — APPOINTMENT (OUTPATIENT)
Dept: OPHTHALMOLOGY | Facility: CLINIC | Age: 72
End: 2022-08-12

## 2022-11-29 ENCOUNTER — APPOINTMENT (OUTPATIENT)
Dept: OPHTHALMOLOGY | Facility: CLINIC | Age: 72
End: 2022-11-29

## 2023-02-14 ENCOUNTER — APPOINTMENT (OUTPATIENT)
Dept: OPHTHALMOLOGY | Facility: CLINIC | Age: 73
End: 2023-02-14
Payer: MEDICARE

## 2023-02-14 ENCOUNTER — NON-APPOINTMENT (OUTPATIENT)
Age: 73
End: 2023-02-14

## 2023-02-14 PROCEDURE — 92134 CPTRZ OPH DX IMG PST SGM RTA: CPT

## 2023-02-14 PROCEDURE — 92014 COMPRE OPH EXAM EST PT 1/>: CPT

## 2023-03-28 NOTE — ED ADULT NURSE NOTE - CHPI ED NUR SEVERITY2
PAIN SCALE 7 OF 10.

## 2023-04-04 ENCOUNTER — APPOINTMENT (OUTPATIENT)
Dept: SURGICAL ONCOLOGY | Facility: CLINIC | Age: 73
End: 2023-04-04
Payer: MEDICARE

## 2023-04-04 VITALS
WEIGHT: 126 LBS | DIASTOLIC BLOOD PRESSURE: 76 MMHG | TEMPERATURE: 98.3 F | HEART RATE: 80 BPM | HEIGHT: 63 IN | SYSTOLIC BLOOD PRESSURE: 115 MMHG | OXYGEN SATURATION: 96 % | BODY MASS INDEX: 22.32 KG/M2

## 2023-04-04 DIAGNOSIS — Z86.39 PERSONAL HISTORY OF OTHER ENDOCRINE, NUTRITIONAL AND METABOLIC DISEASE: ICD-10-CM

## 2023-04-04 DIAGNOSIS — Z78.9 OTHER SPECIFIED HEALTH STATUS: ICD-10-CM

## 2023-04-04 DIAGNOSIS — Z80.41 FAMILY HISTORY OF MALIGNANT NEOPLASM OF OVARY: ICD-10-CM

## 2023-04-04 PROCEDURE — 99204 OFFICE O/P NEW MOD 45 MIN: CPT

## 2023-04-04 RX ORDER — BUPROPION HYDROCHLORIDE 300 MG/1
300 TABLET, EXTENDED RELEASE ORAL
Refills: 0 | Status: ACTIVE | COMMUNITY

## 2023-04-04 RX ORDER — OLOPATADINE HYDROCHLORIDE 2 MG/ML
0.2 SOLUTION OPHTHALMIC DAILY
Qty: 1 | Refills: 3 | Status: DISCONTINUED | COMMUNITY
Start: 2017-06-15 | End: 2023-04-04

## 2023-04-04 RX ORDER — IBUPROFEN 200 MG
600 CAPSULE ORAL
Refills: 0 | Status: ACTIVE | COMMUNITY

## 2023-04-04 RX ORDER — CYCLOSPORINE 0.5 MG/ML
0.05 EMULSION OPHTHALMIC TWICE DAILY
Qty: 1 | Refills: 4 | Status: DISCONTINUED | COMMUNITY
Start: 2017-06-15 | End: 2023-04-04

## 2023-04-04 RX ORDER — CYCLOSPORINE 0.5 MG/ML
0.05 EMULSION OPHTHALMIC TWICE DAILY
Qty: 1 | Refills: 4 | Status: DISCONTINUED | COMMUNITY
Start: 2017-02-16 | End: 2023-04-04

## 2023-04-04 RX ORDER — ERGOCALCIFEROL 1.25 MG/1
CAPSULE ORAL
Refills: 0 | Status: ACTIVE | COMMUNITY

## 2023-04-04 RX ORDER — OLOPATADINE HCL 1 MG/ML
0.1 SOLUTION/ DROPS OPHTHALMIC DAILY
Qty: 10 | Refills: 0 | Status: DISCONTINUED | COMMUNITY
Start: 2017-02-16 | End: 2023-04-04

## 2023-04-04 RX ORDER — TOBRAMYCIN AND DEXAMETHASONE 3; 1 MG/ML; MG/ML
0.3-0.1 SUSPENSION/ DROPS OPHTHALMIC
Qty: 1 | Refills: 3 | Status: DISCONTINUED | COMMUNITY
Start: 2017-04-18 | End: 2023-04-04

## 2023-04-04 RX ORDER — OLOPATADINE HYDROCHLORIDE 2 MG/ML
0.2 SOLUTION OPHTHALMIC DAILY
Qty: 1 | Refills: 5 | Status: DISCONTINUED | COMMUNITY
Start: 2017-06-15 | End: 2023-04-04

## 2023-04-04 RX ORDER — FLUOXETINE HYDROCHLORIDE 20 MG/1
20 CAPSULE ORAL
Refills: 0 | Status: ACTIVE | COMMUNITY

## 2023-04-10 NOTE — REVIEW OF SYSTEMS
[Negative] : Heme/Lymph [FreeTextEntry5] : occasional chest discomfort [FreeTextEntry8] : as per HPI [de-identified] : depression [de-identified] : diabetes

## 2023-04-10 NOTE — ASSESSMENT
[FreeTextEntry1] : I) Pancreas mass\par \par P) Long discussion w patient and family about clinical findings. 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 Has not been biopsied as yet. It is resectable. Discussed that from a surgical standpoint will need a completion pancreatectomy and associated comorbidities with respect to glucose control. Also discussed options of preopchemo prior to surgery (in which case would need a biopsy to prove diagnosis) vs resection now and then adjuvant treatment. Will be seeking other opinions. All questions answered.\par \par Fei Roldan MD\par \par Chief Surgical Oncology\par Multidisciplinary GI cancer program\par [facility]\par [facility]\par \par Professor Surgery\par [employer]\par \par cc Dr Mariam Osorio

## 2023-04-10 NOTE — HISTORY OF PRESENT ILLNESS
[de-identified] : Patient Name: TITO AGUILAR \par MRN: [id number] \par [place] MRN:\par Referring Provider: Dr. Mariam Osorio\par Oncologist: Dr. Fabian\par Date: 03/29/2023 \par \par Diagnosis: Pancreas lesion\par \par 72 year female  presents for evaluation of a pancreas lesion.\par 1999 - s/p Whipple then chemo/RT for pancreas cancer\par She was following up with Dr. Fabian and on surveillance. She last saw him prior to COVID.\par \par A few years after the Whipple she developed Diabetes and has been on Insulin for the past 10 years. She has also been suffering from a decreased appetite since her Whipple surgery but she was managing. She has had multiple SBO since her Whipple requiring hospitalization and medical management.\par July 2022 - she developed symptoms of epigastric pains, pruritus, nausea and had an MRI that was reportedly normal. Her symptoms continued.\par January 2023 - she started to lose weight quickly (lost about 20 lbs over the past few months)\par February 22, 2022 - CT AP showed no evidence of mets or recurrence\par March 20, 2023 - HgA1c is 9.3\par March 22, 2023 - MRI showed a new 2.3 cm pancreas body mass.\par March 29, 2023 - CA 19-9; 315.8 \par \par Currently, Ms. AGUILAR has occasional abdominal pain, she does have a decreased appetite and is nauseous but no vomiting. She denies changes to bowel movements. \par \par Functional Status: Ms. AGUILAR is able to slowly walk up 1-2 flights of stairs without fatigue or dyspnea.\par

## 2023-04-10 NOTE — RESULTS/DATA
[FreeTextEntry1] : Date: 3/29/23 \par Results: CA 19-9; 315.8\par \par Date: 3/22/23\par Study: MRI Abdomen WWO ([facility])\par Results: New 2.3 cm hypoenhancing pancreatic body mass upstream from the srugical margin with new estelle dilation, highly suspicious for recurrent neoplasm. Splenic vessel abutment without other vascular involvement. No other foci of disease in the abdomen.\par \par Date: 3/20/23\par Study: Glucose 297, HgbA1c 9.3\par \par Date: 2/22/23\par Study: CT AP W ([facility])\par Results:  STatus post Whipple procedure without CT features of local recurrence. No evidence of abdominopelvic metastasis. No acute abdominopelvic abnormality.\par \par Date: 2/9/23\par Study: EGD (Dr. Osorio)\par Results: Normal mucosa in the esophagus. Gastric previous surgery (biopsy). Normal mucosa in the small bowel (biopsy)\par \par Date: 1/23/23\par Results: Glucose 334, HgbA1 9.3, Amylase 28, Lipase 7, AST 13, ALT 13, TBili 0.5, Alk Phos 138\par \par Date: 7/29/22\par Study: MRI Abdomen WWO ([facility])\par Results: Status post Whipple procedure without evidence of local recurrent or abdominal metastatic disease

## 2023-04-27 ENCOUNTER — LABORATORY RESULT (OUTPATIENT)
Age: 73
End: 2023-04-27

## 2023-04-27 ENCOUNTER — APPOINTMENT (OUTPATIENT)
Dept: ENDOCRINOLOGY | Facility: CLINIC | Age: 73
End: 2023-04-27
Payer: MEDICARE

## 2023-04-27 VITALS
HEART RATE: 78 BPM | BODY MASS INDEX: 21.62 KG/M2 | OXYGEN SATURATION: 98 % | HEIGHT: 63 IN | SYSTOLIC BLOOD PRESSURE: 124 MMHG | TEMPERATURE: 96.6 F | WEIGHT: 122 LBS | DIASTOLIC BLOOD PRESSURE: 78 MMHG

## 2023-04-27 DIAGNOSIS — E78.5 HYPERLIPIDEMIA, UNSPECIFIED: ICD-10-CM

## 2023-04-27 DIAGNOSIS — F03.90 UNSPECIFIED DEMENTIA W/OUT BEHAVIORAL DISTURBANCE: ICD-10-CM

## 2023-04-27 DIAGNOSIS — Z87.828 PERSONAL HISTORY OF OTHER (HEALED) PHYSICAL INJURY AND TRAUMA: ICD-10-CM

## 2023-04-27 DIAGNOSIS — S93.401A SPRAIN OF UNSPECIFIED LIGAMENT OF RIGHT ANKLE, INITIAL ENCOUNTER: ICD-10-CM

## 2023-04-27 DIAGNOSIS — K86.81 EXOCRINE PANCREATIC INSUFFICIENCY: ICD-10-CM

## 2023-04-27 DIAGNOSIS — F32.A DEPRESSION, UNSPECIFIED: ICD-10-CM

## 2023-04-27 DIAGNOSIS — R10.9 UNSPECIFIED ABDOMINAL PAIN: ICD-10-CM

## 2023-04-27 DIAGNOSIS — K21.9 GASTRO-ESOPHAGEAL REFLUX DISEASE W/OUT ESOPHAGITIS: ICD-10-CM

## 2023-04-27 DIAGNOSIS — H10.13 ACUTE ATOPIC CONJUNCTIVITIS, BILATERAL: ICD-10-CM

## 2023-04-27 DIAGNOSIS — R63.0 ANOREXIA: ICD-10-CM

## 2023-04-27 DIAGNOSIS — C25.9 MALIGNANT NEOPLASM OF PANCREAS, UNSPECIFIED: ICD-10-CM

## 2023-04-27 PROCEDURE — 99205 OFFICE O/P NEW HI 60 MIN: CPT

## 2023-04-27 RX ORDER — INSULIN LISPRO 100 U/ML
100 INJECTION, SOLUTION SUBCUTANEOUS
Refills: 0 | Status: ACTIVE | COMMUNITY
Start: 2023-04-27

## 2023-04-27 RX ORDER — PANTOPRAZOLE 40 MG/1
40 TABLET, DELAYED RELEASE ORAL DAILY
Qty: 30 | Refills: 6 | Status: ACTIVE | COMMUNITY
Start: 2023-04-27

## 2023-04-27 RX ORDER — MEMANTINE HYDROCHLORIDE 10 MG/1
10 TABLET, FILM COATED ORAL
Refills: 0 | Status: DISCONTINUED | COMMUNITY
End: 2023-04-27

## 2023-04-27 RX ORDER — OXYCODONE 5 MG/1
5 TABLET ORAL
Refills: 0 | Status: ACTIVE | COMMUNITY
Start: 2023-04-27

## 2023-04-27 RX ORDER — LEVOTHYROXINE SODIUM 112 UG/1
112 TABLET ORAL
Qty: 100 | Refills: 2 | Status: ACTIVE | COMMUNITY

## 2023-04-27 RX ORDER — URSODIOL 250 MG/1
250 TABLET ORAL
Refills: 0 | Status: DISCONTINUED | COMMUNITY
End: 2023-04-27

## 2023-04-27 RX ORDER — INSULIN GLARGINE 100 [IU]/ML
100 INJECTION, SOLUTION SUBCUTANEOUS
Qty: 10 | Refills: 3 | Status: ACTIVE | COMMUNITY
Start: 2023-04-27

## 2023-04-27 RX ORDER — MEMANTINE HYDROCHLORIDE 10 MG/1
10 TABLET ORAL TWICE DAILY
Refills: 0 | Status: ACTIVE | COMMUNITY
Start: 2023-04-27

## 2023-04-27 RX ORDER — PANCRELIPASE LIPASE, PANCRELIPASE PROTEASE, PANCRELIPASE AMYLASE 40000; 126000; 168000 [USP'U]/1; [USP'U]/1; [USP'U]/1
40000-126000 CAPSULE, DELAYED RELEASE ORAL
Refills: 0 | Status: DISCONTINUED | COMMUNITY
End: 2023-04-27

## 2023-04-27 RX ORDER — ATORVASTATIN CALCIUM 20 MG/1
20 TABLET, FILM COATED ORAL
Qty: 90 | Refills: 1 | Status: ACTIVE | COMMUNITY
Start: 2023-04-27

## 2023-04-27 RX ORDER — DEXLANSOPRAZOLE 60 MG/1
60 CAPSULE, DELAYED RELEASE ORAL
Refills: 0 | Status: DISCONTINUED | COMMUNITY
End: 2023-04-27

## 2023-04-27 RX ORDER — METOPROLOL SUCCINATE 50 MG/1
50 TABLET, EXTENDED RELEASE ORAL DAILY
Qty: 90 | Refills: 3 | Status: ACTIVE | COMMUNITY

## 2023-04-27 RX ORDER — FESOTERODINE FUMARATE 8 MG/1
8 TABLET, FILM COATED, EXTENDED RELEASE ORAL
Refills: 0 | Status: DISCONTINUED | COMMUNITY
End: 2023-04-27

## 2023-04-27 RX ORDER — MONTELUKAST 10 MG/1
TABLET, FILM COATED ORAL
Refills: 0 | Status: DISCONTINUED | COMMUNITY
End: 2023-04-27

## 2023-04-27 RX ORDER — PANCRELIPASE LIPASE, PANCRELIPASE PROTEASE, PANCRELIPASE AMYLASE 40000; 126000; 168000 [USP'U]/1; [USP'U]/1; [USP'U]/1
40000-126000 CAPSULE, DELAYED RELEASE ORAL
Refills: 0 | Status: ACTIVE | COMMUNITY
Start: 2023-04-27

## 2023-04-27 RX ORDER — ATORVASTATIN CALCIUM 20 MG/1
20 TABLET, FILM COATED ORAL
Refills: 0 | Status: DISCONTINUED | COMMUNITY
End: 2023-04-27

## 2023-04-28 PROBLEM — R63.0 POOR APPETITE: Status: ACTIVE | Noted: 2023-04-27

## 2023-04-30 NOTE — ED PROVIDER NOTE - NS_EDPROVIDERDISPOUSERTYPE_ED_A_ED
Problem: Potential for Falls  Goal: Patient will remain free of falls  Description: INTERVENTIONS:  - Educate patient/family on patient safety including physical limitations  - Instruct patient to call for assistance with activity   - Consult OT/PT to assist with strengthening/mobility   - Keep Call bell within reach  - Keep bed low and locked with side rails adjusted as appropriate  - Keep care items and personal belongings within reach  - Initiate and maintain comfort rounds  - Make Fall Risk Sign visible to staff  - Offer Toileting every 2 Hours, in advance of need  - Initiate/Maintain bed/chair alarm  - Apply yellow socks and bracelet for high fall risk patients  - Consider moving patient to room near nurses station  Outcome: Progressing     Problem: PAIN - ADULT  Goal: Verbalizes/displays adequate comfort level or baseline comfort level  Description: Interventions:  - Encourage patient to monitor pain and request assistance  - Assess pain using appropriate pain scale  - Administer analgesics based on type and severity of pain and evaluate response  - Implement non-pharmacological measures as appropriate and evaluate response  - Consider cultural and social influences on pain and pain management  - Notify physician/advanced practitioner if interventions unsuccessful or patient reports new pain  Outcome: Progressing     Problem: INFECTION - ADULT  Goal: Absence or prevention of progression during hospitalization  Description: INTERVENTIONS:  - Assess and monitor for signs and symptoms of infection  - Monitor lab/diagnostic results  - Monitor all insertion sites, i e  indwelling lines, tubes, and drains  - Monitor endotracheal if appropriate and nasal secretions for changes in amount and color  - [place] appropriate cooling/warming therapies per order  - Administer medications as ordered  - Instruct and encourage patient and family to use good hand hygiene technique  - Identify and instruct in appropriate isolation precautions for identified infection/condition  Outcome: Progressing     Problem: SAFETY ADULT  Goal: Patient will remain free of falls  Description: INTERVENTIONS:  - Educate patient/family on patient safety including physical limitations  - Instruct patient to call for assistance with activity   - Consult OT/PT to assist with strengthening/mobility   - Keep Call bell within reach  - Keep bed low and locked with side rails adjusted as appropriate  - Keep care items and personal belongings within reach  - Initiate and maintain comfort rounds  - Make Fall Risk Sign visible to staff  - Offer Toileting every 2 Hours, in advance of need  - Initiate/Maintain bed/chair alarm  - Apply yellow socks and bracelet for high fall risk patients  - Consider moving patient to room near nurses station  Outcome: Progressing  Goal: Maintain or return to baseline ADL function  Description: INTERVENTIONS:  -  Assess patient's ability to carry out ADLs; assess patient's baseline for ADL function and identify physical deficits which impact ability to perform ADLs (bathing, care of mouth/teeth, toileting, grooming, dressing, etc )  - Assess/evaluate cause of self-care deficits   - Assess range of motion  - Assess patient's mobility; develop plan if impaired  - Assess patient's need for assistive devices and provide as appropriate  - Encourage maximum independence but intervene and supervise when necessary  - Involve family in performance of ADLs  - Assess for home care needs following discharge   - Consider OT consult to assist with ADL evaluation and planning for discharge  - Provide patient education as appropriate  Outcome: Not Progressing  Goal: Maintains/Returns to pre admission functional level  Description: INTERVENTIONS:  - Perform BMAT or MOVE assessment daily    - Set and communicate daily mobility goal to care team and patient/family/caregiver     - Collaborate with rehabilitation services on mobility goals if consulted  - Perform Range of Motion 3 times a day  - Reposition patient every 2 hours if unable to reposition self  - Record patient progress and toleration of activity level   Outcome: Not Progressing     Problem: DISCHARGE PLANNING  Goal: Discharge to home or other facility with appropriate resources  Description: INTERVENTIONS:  - Identify barriers to discharge w/patient and caregiver  - Arrange for needed discharge resources and transportation as appropriate  - Identify discharge learning needs (meds, wound care, etc )  - Arrange for interpretive services to assist at discharge as needed  - Refer to Case Management Department for coordinating discharge planning if the patient needs post-hospital services based on physician/advanced practitioner order or complex needs related to functional status, cognitive ability, or social support system  Outcome: Progressing     Problem: Knowledge Deficit  Goal: Patient/family/caregiver demonstrates understanding of disease process, treatment plan, medications, and discharge instructions  Description: Complete learning assessment and assess knowledge base    Interventions:  - Provide teaching at level of understanding  - Provide teaching via preferred learning methods  Outcome: Progressing     Problem: NEUROSENSORY - ADULT  Goal: Achieves stable or improved neurological status  Description: INTERVENTIONS  - Monitor and report changes in neurological status  - Monitor vital signs such as temperature, blood pressure, glucose, and any other labs ordered   - Initiate measures to prevent increased intracranial pressure  - Monitor for seizure activity and implement precautions if appropriate      Outcome: Not Progressing  Goal: Remains free of injury related to seizures activity  Description: INTERVENTIONS  - Maintain airway, patient safety  and administer oxygen as ordered  - Monitor patient for seizure activity, document and report duration and description of seizure to physician/advanced practitioner  - If seizure occurs,  ensure patient safety during seizure  - Reorient patient post seizure  - Seizure pads on all 4 side rails  - Instruct patient/family to notify RN of any seizure activity including if an aura is experienced  - Instruct patient/family to call for assistance with activity based on nursing assessment  - Administer anti-seizure medications if ordered    Outcome: Progressing  Goal: Achieves maximal functionality and self care  Description: INTERVENTIONS  - Monitor swallowing and airway patency with patient fatigue and changes in neurological status  - Encourage and assist patient to increase activity and self care     - Encourage visually impaired, hearing impaired and aphasic patients to use assistive/communication devices  Outcome: Progressing     Problem: RESPIRATORY - ADULT  Goal: Achieves optimal ventilation and oxygenation  Description: INTERVENTIONS:  - Assess for changes in respiratory status  - Assess for changes in mentation and behavior  - Position to facilitate oxygenation and minimize respiratory effort  - Oxygen administered by appropriate delivery if ordered  - Initiate smoking cessation education as indicated  - Encourage broncho-pulmonary hygiene including cough, deep breathe, Incentive Spirometry  - Assess the need for suctioning and aspirate as needed  - Assess and instruct to report SOB or any respiratory difficulty  - Respiratory Therapy support as indicated  Outcome: Not Progressing     Problem: MOBILITY - ADULT  Goal: Maintain or return to baseline ADL function  Description: INTERVENTIONS:  -  Assess patient's ability to carry out ADLs; assess patient's baseline for ADL function and identify physical deficits which impact ability to perform ADLs (bathing, care of mouth/teeth, toileting, grooming, dressing, etc )  - Assess/evaluate cause of self-care deficits   - Assess range of motion  - Assess patient's mobility; develop plan if impaired  - Assess patient's need for assistive devices and provide as appropriate  - Encourage maximum independence but intervene and supervise when necessary  - Involve family in performance of ADLs  - Assess for home care needs following discharge   - Consider OT consult to assist with ADL evaluation and planning for discharge  - Provide patient education as appropriate  Outcome: Not Progressing  Goal: Maintains/Returns to pre admission functional level  Description: INTERVENTIONS:  - Perform BMAT or MOVE assessment daily    - Set and communicate daily mobility goal to care team and patient/family/caregiver  - Collaborate with rehabilitation services on mobility goals if consulted  - Perform Range of Motion 3 times a day  - Reposition patient every 2 hours if unable to reposition self  - Record patient progress and toleration of activity level   Outcome: Not Progressing     Problem: Neurological Deficit  Goal: Neurological status is stable or improving  Description: Interventions:  - Monitor and assess patient's level of consciousness, motor function, sensory function, and level of assistance needed for ADLs  - Monitor and report changes from baseline  Collaborate with interdisciplinary team to initiate plan and implement interventions as ordered  - Provide and maintain a safe environment  - Consider seizure precautions  - Consider fall precautions  - Consider aspiration precautions  - Consider bleeding precautions  Outcome: Not Progressing     Problem: Activity Intolerance/Impaired Mobility  Goal: Mobility/activity is maintained at optimum level for patient  Description: Interventions:  - Assess and monitor patient  barriers to mobility and need for assistive/adaptive devices  - Assess patient's emotional response to limitations  - Collaborate with interdisciplinary team and initiate plans and interventions as ordered  - Encourage independent activity per ability   - Maintain proper body alignment    - Perform active/passive rom as tolerated/ordered  - Plan activities to conserve energy   - Turn patient as appropriate  Outcome: Progressing     Problem: Communication Impairment  Goal: Ability to express needs and understand communication  Description: Assess patient's communication skills and ability to understand information  Patient will demonstrate use of effective communication techniques, alternative methods of communication and understanding even if not able to speak  - Encourage communication and provide alternate methods of communication as needed  - Collaborate with case management/ for discharge needs  - Include patient/family/caregiver in decisions related to communication  Outcome: Progressing     Problem: Potential for Aspiration  Goal: Non-ventilated patient's risk of aspiration is minimized  Description: Assess and monitor vital signs, respiratory status, and labs (WBC)  Monitor for signs of aspiration (tachypnea, cough, rales, wheezing, cyanosis, fever)  - Assess and monitor patient's ability to swallow  - Place patient up in chair to eat if possible  - HOB up at 90 degrees to eat if unable to get patient up into chair   - Supervise patient during oral intake  - Instruct patient/ family to take small bites  - Instruct patient/ family to take small single sips when taking liquids  - Follow patient-specific strategies generated by speech pathologist   Outcome: Progressing     Problem: Nutrition  Goal: Nutrition/Hydration status is improving  Description: Monitor and assess patient's nutrition/hydration status for malnutrition (ex- brittle hair, bruises, dry skin, pale skin and conjunctiva, muscle wasting, smooth red tongue, and disorientation)  Collaborate with interdisciplinary team and initiate plan and interventions as ordered  Monitor patient's weight and dietary intake as ordered or per policy  Utilize nutrition screening tool and intervene per policy   Determine patient's food preferences and provide high-protein, high-caloric foods as appropriate  - Assist patient with eating   - Allow adequate time for meals   - Encourage patient to take dietary supplement as ordered  - Collaborate with clinical nutritionist   - Include patient/family/caregiver in decisions related to nutrition    Outcome: Progressing Scribe Attestation (For Scribes USE Only)... Attending Attestation (For Attendings USE Only).../Scribe Attestation (For Scribes USE Only)...

## 2023-05-01 LAB — CORTIS SERPL-MCNC: 11.1 UG/DL

## 2023-05-01 NOTE — PHYSICAL EXAM
[Alert] : alert [No Acute Distress] : no acute distress [Normal Sclera/Conjunctiva] : normal sclera/conjunctiva [EOMI] : extra ocular movement intact [PERRL] : pupils equal, round and reactive to light [No Proptosis] : no proptosis [No Lid Lag] : no lid lag [Normal Outer Ear/Nose] : the ears and nose were normal in appearance [Normal Hearing] : hearing was normal [No Neck Mass] : no neck mass was observed [No LAD] : no lymphadenopathy [Thyroid Not Enlarged] : the thyroid was not enlarged [No Thyroid Nodules] : no palpable thyroid nodules [Clear to Auscultation] : lungs were clear to auscultation bilaterally [No Murmurs] : no murmurs [Normal Rate] : heart rate was normal [Regular Rhythm] : with a regular rhythm [Carotids Normal] : carotid pulses were normal with no bruits [No HSM] : no hepato-splenomegaly [Soft] : abdomen soft [Normal Supraclavicular Nodes] : no supraclavicular lymphadenopathy [Normal Anterior Cervical Nodes] : no anterior cervical lymphadenopathy [No CVA Tenderness] : no ~M costovertebral angle tenderness [Scoliosis] : scoliosis present [No Involuntary Movements] : no involuntary movements were seen [No Joint Swelling] : no joint swelling seen [Normal] : normal [0] : 0 in the posterior tibialis [2+] : 2+ in the dorsalis pedis [Vibration Dec.] : diminished vibratory sensation at the level of the toes [No Tremors] : no tremors [Normal Sensation on Monofilament Testing] : normal sensation on monofilament testing of lower extremities [Kyphosis] : no kyphosis present [Acanthosis Nigricans] : no acanthosis nigricans [Foot Ulcers] : no foot ulcers [Hirsutism] : no hirsutism [Delayed in the Right Toes] : normal in the toes [Delayed in the Left Toes] : normal in the toes [Position Sense Dec.] : normal position sense at the level of the toes [#1 Diminished] : number 1 was normal [#2 Diminished] : number 2 was normal [#3 Diminished] : number 3 was normal [#4 Diminished] : number 4 was normal [#5 Diminished] : number 5 was normal [#6 Diminished] : number 6 was normal [#7 Diminished] : number 7 was normal [#8 Diminished] : number 8 was normal [#9 Diminished] : number 9 was normal [#10 Diminished] : number 10 was normal [de-identified] : Appears weak, chronically ill [de-identified] : No corneal arcus.  Mild proptosis, significant infraorbital puffiness.  No diplopia at extremes of gaze [de-identified] : Trace LE edema [de-identified] : LUQ incision, staples still in place, wound without erythema or drainage [de-identified] : Appeared moderately dysphoric

## 2023-05-01 NOTE — REASON FOR VISIT
[Initial Evaluation] : an initial evaluation [DM Type 2] : DM Type 2 [FreeTextEntry2] : Dr. Ac Sanders

## 2023-05-01 NOTE — REVIEW OF SYSTEMS
[Fatigue] : fatigue [Decreased Appetite] : decreased appetite [Constipation] : constipation [Muscle Weakness] : muscle weakness [Back Pain] : back pain [Difficulty Walking] : difficulty walking [Depression] : depression [Insomnia] : insomnia [Fever] : no fever [Chills] : no chills [Dry Eyes] : no dryness [Blurred Vision] : no blurred vision [Eyes Itch] : no itch [Dysphagia] : no dysphagia [Hearing Loss] : no hearing loss  [Chest Pain] : no chest pain [Palpitations] : no palpitations [Lower Ext Edema] : no lower extremity edema [Shortness Of Breath] : no shortness of breath [Cough] : no cough [Wheezing] : no wheezing [Nausea] : no nausea [Heartburn] : no heartburn [Diarrhea] : no diarrhea [Polyuria] : no polyuria [Dysuria] : no dysuria [Joint Pain] : no joint pain [Joint Stiffness] : no joint stiffness [Ulcer] : no ulcer [Pain/Numbness of Digits] : no pain/numbness of digits [Easy Bleeding] : no ~M tendency for easy bleeding [Easy Bruising] : no tendency for easy bruising [FreeTextEntry2] : Had lost 20 lb in weight prior to her recent surgery, though may have since stabilized [FreeTextEntry6] : Significant WILKERSON [FreeTextEntry9] : Had a previous epidural steroid injection for back pain, but with minimal relief [de-identified] : Onychomycosis of the nails on both first toes [de-identified] : Tends to get headaches when her blood sugar is high, and tremors with hypoglycemic reactions

## 2023-05-01 NOTE — ADDENDUM
[FreeTextEntry1] : TFT results received on 5/1/23:\par Free T4 well into the normal range at 1.5 ng/dl (0.9-1.8) but TSH significantly elevated at 17.4 uU/ml  (0.27-4.2)\par Given the normal free T4 level, suspect that these results reflect an interruption (and then restarting) of therapy.  Likely missed doses during her hospitalization,\par Also need to be sure she is receiving the medication at least 30-45 min before breakfast,, just with water, and no other meds at the same time

## 2023-05-01 NOTE — HISTORY OF PRESENT ILLNESS
[FreeTextEntry1] : 72-yo woman with pancreatic CA, s/p Whipple procedure by Dr. 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  Presents today for evaluation and management of diabetes mellitus and hypothyroidism.\par Came to the visit accompanied by her sister and niece\par Onset of diabetes was approximately 3-5 years after the initial (Whipple) surgery.  Was treated with a variety of oral agents over the next several years, with insulin started approximately 8 years ago.  Usual insulin regimen was 7-8 units Novolog before meals, and approximately 10 units Levemir qhs.  She is unable to provide a good idea about her overall glycemic control during the past few years, during which time she was being followed by Dr. Melody Umana at [facility].  Review her labs on My Chart between 2018 and 2022 showed only a single A1c result, and this was mildly above target at 7.6%.\par Her blood sugars then nury significantly beginning approximately 2 months prior to discovery of the recurrent tumor, and her A1c level was 9.9% in late February.  She was very vague about how much she increased her insulin (if at all) during this interval, but it was probably only a few units for each dose.  She also lost 20 lb in weight during this interval. \par Her surgery was done almost exactly 2 weeks ago.  She was discharged to [facility] on an insulin regimen of 18 units Lantus qhs, premeal lispro on a sliding scale of 3-10 units for glucoses > 130.  She brought a log of her recent fingersticks [facility], which showed markedly fluctuating values.  It was difficult to interpret some of these because the log did not document what insulin was given, (and there is a question as to whether Lantus may have been held one night when her glucose was 130 mg%--because she spiked t 306 mg% the next morning\par Her appetite remains extremely poor, though has started to improve in the past few days.  The family has been bringing in food, but this has only helped somewhat.  She has not been receiving enteral supplements.\par Also of note is that she was discharged on Megase 400 mg/day.  She says that she often refused it in the hospital because she did not like the taste of the liquid, but has been taking it consistently at [facility]..\par \par Endocrine history is also significant for previous Graves' disease which was treated with I-131 ablation.  Has been on levothyroxine replacement since the radioiodine, with the dose apparently fluctuating between 112 and 100 mcg/day.  Has residual ophthalmopathy\par

## 2023-05-01 NOTE — ASSESSMENT
[FreeTextEntry2] : Diet (discussed with sister and niece regarding food they bring her) [FreeTextEntry1] : 1) Insulin-requiring DM:  Glycemic control is obviously poor, but is expected to be difficult given her poor and somewhat unpredictable PO intake.  Her basal insulin requirement is somewhat higher than her pre-op dose, though it is unclear if she was in fact controlled on 10 units of Levemir.  Conversely, her premeal insulin doses are lower than before surgery, presumably only because of her poor oral intake.  Superimposed on these factors is the possibility that her insulin requirements are being increased by the Megace, which needs to be tapered and discontinued because of its potential for inducing adrenal suppression.  \par For now:\par --Would continue the Lantus at 18 units.  It should not be held for fingersticks in the low 100 range or below 100 mg%.  If below 100, pt should be given 4-6 oz of juice, and then the Lantus given after her blood sugar is back over 110\par --Would increase the Admelog as follows:\par \par   Pre-meal Admelog                             \par \par < 100---hold Admelog\par 100-140--3 units                              \par 140-180:  5 units                             \par 181-240   7 units                             \par 241-300   9 units                              \par 301-350  11 units                             \par over 350  13 units                              \par \par Bedtime coverage (in addition to Lantus)\par \par < 180        none\par 181-240    2 units\par 241-300    3 units\par over 300   4 units\par \par If possible, would like to have her fingerstick data faxed to me each week, preferably along with the actual insulin doses given, and I will advise regarding adjustments.\par \par 2) Steroid-induced adrenal suppression:  Would assume that this is present to a certain extent from the high-dose Megace therapy, and makes it risky to simply stop the drug "cold turkey.".  Cortisol level was drawn today, and was in the mid-normal range at 11 mcg/dl.  (Result received the AM of 4/28).\par --Would probably taper off the Megace by doing 200 mg/day for 5 days, then D/C\par \par 3) Hypothyroidism:  No recent TFTs are available, but would be concerned that her previous levothyroxine dose of 112 mcg/day could be excessive given her weight loss.  TFTs were drawn today but the results are not yet available.\par --Continue 112 mcg/day pending results of the TFTs. \par \par 4) Hypertension: BP was excellent at today's visit.\par --Continue metoprolol\par \par 5) Depression:  Pt and her family asked about restarting the Prozac, which was previously an important part of her regimen and seems to have lapsed.\par --Would like to have the Prozac restarted at 10 mg/day for now.\par --Would also suggest starting her on Remeron 7.5 mg at bedtime--mostly for appetite stimulation (replacing the Megace) and to help with her insomnia.\par It appears that she was seen by a psychiatrist since her transfer to [facility].  If so, the these suggestions should be discussed with the psychiatrist.\par \par 6) Poor appetite:  Most likely the result of her surgery, ?? contribution from depression.\par --Change megace to Remeron as discussed above\par --Would offer enteral supplements at meals--preferably a semi-elemental formula such as Vital or Peptamen, otherwise Glucerna or Ensure Max\par \par Will see for follow-up in 2-3 weeks\par

## 2023-05-01 NOTE — DATA REVIEWED
[FreeTextEntry1] : Labs done at Rehab  (4/24/23)\par \par Glucose 272\par A1c 8.6%\par CMP--Na 134, Alk phos 447 U/l (), Creatinine 0.73 mg%\par Hb 9.4 gm

## 2023-05-25 ENCOUNTER — NON-APPOINTMENT (OUTPATIENT)
Age: 73
End: 2023-05-25

## 2023-06-23 ENCOUNTER — APPOINTMENT (OUTPATIENT)
Dept: ENDOCRINOLOGY | Facility: CLINIC | Age: 73
End: 2023-06-23
Payer: MEDICARE

## 2023-06-23 VITALS
OXYGEN SATURATION: 98 % | HEIGHT: 63 IN | BODY MASS INDEX: 21.09 KG/M2 | TEMPERATURE: 97.3 F | DIASTOLIC BLOOD PRESSURE: 80 MMHG | HEART RATE: 72 BPM | WEIGHT: 119 LBS | SYSTOLIC BLOOD PRESSURE: 120 MMHG

## 2023-06-23 DIAGNOSIS — E89.0 POSTPROCEDURAL HYPOTHYROIDISM: ICD-10-CM

## 2023-06-23 DIAGNOSIS — Z79.4 TYPE 2 DIABETES MELLITUS W/OUT COMPLICATIONS: ICD-10-CM

## 2023-06-23 DIAGNOSIS — I10 ESSENTIAL (PRIMARY) HYPERTENSION: ICD-10-CM

## 2023-06-23 DIAGNOSIS — E11.9 TYPE 2 DIABETES MELLITUS W/OUT COMPLICATIONS: ICD-10-CM

## 2023-06-23 DIAGNOSIS — E27.40 UNSPECIFIED ADRENOCORTICAL INSUFFICIENCY: ICD-10-CM

## 2023-06-23 DIAGNOSIS — T38.0X5A UNSPECIFIED ADRENOCORTICAL INSUFFICIENCY: ICD-10-CM

## 2023-06-23 PROCEDURE — 99215 OFFICE O/P EST HI 40 MIN: CPT

## 2023-06-23 RX ORDER — MEGESTROL ACETATE 40 MG/ML
400 SUSPENSION ORAL
Refills: 0 | Status: DISCONTINUED | COMMUNITY
Start: 2023-04-27 | End: 2023-06-23

## 2023-06-23 NOTE — ASSESSMENT
[FreeTextEntry2] : Diet, premeal insulin dosing, snacking [FreeTextEntry1] : 1) Type 2:  Lenny data suggests that glycemic control has been poor, and there are unfortunately multiple barriers to improved control:\par --Irregular intake due to decreased appetite, likely a residua of her surgery\par --Her tendency to "ad kasey" her insulin doses based on past experience and her premeal glucose level, rather than either maintaining a consistent carb intake or adjusting the dose according to anticipated carb intake\par --Her tendency to underdose the insulin due to fear of hypoglycemia, which may have preceded her recent surgery and has likely been a long-standing pattern\par --Her tendency to snack on fruit between meals--(often of the high-glycemic varieties)--which provokes significant hyperglycemia\par --Her inability to assess post-prandial glucoses and correlate these with her carbohydrate intake in order to use this data to adjust her insulin doses for future meals with similar amounts and types of carb\par --A likely contribution of her dementia to her inability to make better decisions about her insulin dosing\par \par In essence, this combination of the above factors make this an almost impossible situation to treat, which will only worsen at whatever point she starts chemotherapy.\par For now, she is to continue the current regimen  (see below)\par Have asked her to keep a written log of her blood sugars, and also log the doses which she took for each meal.\par All of the above was discussed with her sister\par \par BEFORE MEALS  (NOVOLOG)\par \par BREAKFAST:        5 UNITS\par \par LUNCH                 7 UNITS\par \par SUPPER               10 UNITS\par \par ADD COVERAGE (EXTRA) FOR GLUCOSES OVER 150:\par \par 150-200            ADD 1 UNIT\par 201-250           ADD  2 UNITS\par 251-300           ADD 4 UNITS\par 301-350          ADD  5 UNITS\par OVER 350       ADD 6  UNITS\par \par \par BEDTIME (LEVEMIR)       18 UNITS\par \par EXTRA NOVOLOG (NOT EXTRA LEVEMIR) IF SUGAR IS ELEVATED \par \par 200-250          2 UNITS\par 251-300          4 UNITS\par 301-350          5 UNITS\par OVER 350       6 UNITS\par \par 2) Hypothyroidism:  TSH level was distinctly elevated on bloodwork done at the nursing home, but this was likely due to missed levothyroxine doses in the hospital.  She says that she has been consistent in taking the levothyroxine at home--at least 30 min before breakfast and just with water\par --To repeat TFTs today\par --Continue Synthroid 112 mcg/day pending results\par \par 3) Hypertension:  BP is satisfactory at today's visit.  \par --Continue metoprolol\par \par Will discuss the TFTs results with her sister over the phone next week\par Suggested that the pt start on zinc supplements to help with her dysgeusia\par See for follow-up in 2-3 months, depending on status\par \par 4) Steroid-induced adrenal suppression--is no longer an issue since the Megace was discontinued

## 2023-06-23 NOTE — PHYSICAL EXAM
[Alert] : alert [No Acute Distress] : no acute distress [Normal Sclera/Conjunctiva] : normal sclera/conjunctiva [EOMI] : extra ocular movement intact [PERRL] : pupils equal, round and reactive to light [No Lid Lag] : no lid lag [Normal Outer Ear/Nose] : the ears and nose were normal in appearance [Normal Hearing] : hearing was normal [No Neck Mass] : no neck mass was observed [No LAD] : no lymphadenopathy [Thyroid Not Enlarged] : the thyroid was not enlarged [No Thyroid Nodules] : no palpable thyroid nodules [Clear to Auscultation] : lungs were clear to auscultation bilaterally [No Murmurs] : no murmurs [Normal Rate] : heart rate was normal [Regular Rhythm] : with a regular rhythm [Carotids Normal] : carotid pulses were normal with no bruits [Soft] : abdomen soft [No HSM] : no hepato-splenomegaly [Normal Supraclavicular Nodes] : no supraclavicular lymphadenopathy [Normal Anterior Cervical Nodes] : no anterior cervical lymphadenopathy [No CVA Tenderness] : no ~M costovertebral angle tenderness [Scoliosis] : scoliosis present [No Involuntary Movements] : no involuntary movements were seen [No Joint Swelling] : no joint swelling seen [Normal] : normal [0] : 0 in the posterior tibialis [2+] : 2+ in the dorsalis pedis [Vibration Dec.] : diminished vibratory sensation at the level of the toes [No Tremors] : no tremors [No Edema] : no peripheral edema [Normal Sensation on Monofilament Testing] : normal sensation on monofilament testing of lower extremities [Normal Affect] : the affect was normal [Kyphosis] : no kyphosis present [Acanthosis Nigricans] : no acanthosis nigricans [Foot Ulcers] : no foot ulcers [Hirsutism] : no hirsutism [Delayed in the Right Toes] : normal in the toes [Delayed in the Left Toes] : normal in the toes [Position Sense Dec.] : normal position sense at the level of the toes [#1 Diminished] : number 1 was normal [#2 Diminished] : number 2 was normal [#3 Diminished] : number 3 was normal [#4 Diminished] : number 4 was normal [#5 Diminished] : number 5 was normal [#6 Diminished] : number 6 was normal [#7 Diminished] : number 7 was normal [#8 Diminished] : number 8 was normal [#9 Diminished] : number 9 was normal [#10 Diminished] : number 10 was normal [de-identified] : Looks somewhat stronger than at her last visit, but still appears chronically ill [de-identified] : No corneal arcus.  Mild proptosis and infraorbital puffiness. [de-identified] : Mild epigastric and bilateral UQ guarding [de-identified] : Appeared moderately dysphoric

## 2023-06-23 NOTE — REVIEW OF SYSTEMS
[Fatigue] : fatigue [Decreased Appetite] : decreased appetite [Constipation] : constipation [Muscle Weakness] : muscle weakness [Back Pain] : back pain [Difficulty Walking] : difficulty walking [Depression] : depression [Insomnia] : insomnia [Fever] : no fever [Chills] : no chills [Dry Eyes] : no dryness [Blurred Vision] : no blurred vision [Eyes Itch] : no itch [Dysphagia] : no dysphagia [Hearing Loss] : no hearing loss  [Chest Pain] : no chest pain [Palpitations] : no palpitations [Lower Ext Edema] : no lower extremity edema [Shortness Of Breath] : no shortness of breath [Cough] : no cough [Wheezing] : no wheezing [SOB on Exertion] : no shortness of breath on exertion [Nausea] : no nausea [Heartburn] : no heartburn [Diarrhea] : no diarrhea [Polyuria] : no polyuria [Dysuria] : no dysuria [Joint Pain] : no joint pain [Myalgia] : no myalgia  [Joint Stiffness] : no joint stiffness [Ulcer] : no ulcer [Pain/Numbness of Digits] : no pain/numbness of digits [Easy Bleeding] : no ~M tendency for easy bleeding [Easy Bruising] : no tendency for easy bruising [FreeTextEntry2] : Has lost another 3 lb since her last visit [FreeTextEntry7] : More frequent reflux and heartburn in the past few weeks [FreeTextEntry8] : Nocturia 2-3X/night [de-identified] : Tends to get headaches when her blood sugar is high, and tremors with hypoglycemic reactions [de-identified] : Onychomycosis of the nails on both first toes

## 2023-06-23 NOTE — DATA REVIEWED
[FreeTextEntry1] : Labs done by oncologist 6/20/23:\par \par K 4.4 meq/l\par Creatinine 0.85 mg%\par Alk phos elevated at 255 U/l, AST/ALT normal\par TFTs not done

## 2023-06-23 NOTE — HISTORY OF PRESENT ILLNESS
[FreeTextEntry1] : 72-yo woman with type 2 DM, hypothyroidism and pancreatic CA, s/p Whipple procedure by Dr. Queen in 1999, now s/p completion pancreatectomy for recurrent disease.in the body of the pancreas in March of 2023.  Her diabetes developed approximately 3-5 years after the initial (Whipple) surgery.  She was treated with a variety of oral agents over the next several years, with insulin started in approximately 2014.  Her insulin regimen prior to her more recent surgery in March was 7-8 units Novolog before meals, and approximately 10 units Levemir qhs.  \par Her blood sugars nury significantly beginning approximately 2 months prior to discovery of the recurrent tumor, and her A1c level was 9.9% in late February of 2023.  She also lost 20 lb in weight during this interval. \par Her other active medical problems include hypertension, hyperlipidemia and mild dementia.\par \par She came to the visit accompanied by her sister\par Her current insulin regimen (which was revised by me after her discharge from [facility] in mid-May) is:\par Pre-meal Novolog 5/7/10 (with coverage of 1 unit/50 mg% > 150)\par Bedtime Levemir 18 units (with Novolog coverage of 2/4/5/6 for > 200)\par She monitors with a FreeStyle Lenny, and data from the past month indicates average glucoses as follows:\par MN-6 AM      191    \par 6 AM-noon   163\par Noon-6 PM   229\par 6 PM-MN      252\par I attempted to obtain a recent diet history, but her intake is highly variable and it was difficult to obtain concrete answers.  Her appetite is somewhat better than when she was at Rehab, but it is still well below normal.  She has lost another 3 lb since her initial visit to me in April.  She appears to have significant dysgeusia.\par --Breakfast is variable--sometimes egg, sometimes cottage cheese.  The meal does not always include fruit or bread.  If she is not having any carb at the meal, she will take only 3 units of Novolog, but she cannot say whether her post-prandial glucose will be in target range.  .  \par --Lunch is also quite variable in terms of carb content.  She seems to be taking less than the prescribed 7 units of Novolog--more often 5 units--and admits her blood sugar after the meal is usually well above the target of 150 mg%.  Her blood sugar can also spike to near 300 in the late afternoon, but this is likely due to snacking on fruit between lunch and supper.  \par --Her most frequent starch at supper seems to be potato.  She has been taking only 7 units of Novolog for this meal, but admits that her glucose will then rise significantly in the late evening (as seen in the Lenny averages).\par --Also admits to taking less than 18 units of Levemir at bedtime if her sugar is in the mid-100 range, but cannot say whether her glucose will hold steady overnight with this dose.\par \maryam Saw her oncologist at [facility] last week, and had an MRI which showed several nodular lesions in the R hepatic lobe which had not been seen previously and were suspicious for metastatic disease.  There was also soft tissue thickening along the celiac artery (?? tumor implants vs post-op change) and within the pancreatic bed--(?? recurrent disease vs post-op change).  She has now been referred to the Pancreatic Oncology group at [facility], and will be seeing Dr. Seals on 6/26.\par Her  level was distinctly elevated at 398 (normal < 47)

## 2023-06-27 ENCOUNTER — NON-APPOINTMENT (OUTPATIENT)
Age: 73
End: 2023-06-27

## 2023-06-27 RX ORDER — LEVOTHYROXINE SODIUM 0.12 MG/1
125 TABLET ORAL DAILY
Qty: 90 | Refills: 1 | Status: ACTIVE | COMMUNITY
Start: 2023-06-27 | End: 1900-01-01

## 2023-06-28 LAB
T4 FREE SERPL-MCNC: 1.5 NG/DL
TSH SERPL-ACNC: 7.78 UIU/ML

## 2023-07-07 RX ORDER — INSULIN GLARGINE 100 [IU]/ML
100 INJECTION, SOLUTION SUBCUTANEOUS
Qty: 5 | Refills: 5 | Status: ACTIVE | COMMUNITY
Start: 2023-07-07 | End: 1900-01-01

## 2023-07-07 RX ORDER — INSULIN ASPART 100 [IU]/ML
100 INJECTION, SOLUTION INTRAVENOUS; SUBCUTANEOUS
Qty: 1 | Refills: 5 | Status: ACTIVE | COMMUNITY
Start: 2023-07-07 | End: 1900-01-01

## 2023-08-15 NOTE — ED ADULT NURSE NOTE - CHPI ED NUR SYMPTOMS POS
Head, normocephalic, atraumatic, Face, Face within normal limits, Ears, External ears within normal limits, Nose/Nasopharynx, External nose normal appearance, nares patent, no nasal discharge, Mouth and Throat, Oral cavity appearance normal, Lips, Appearance normal NAUSEA/PAIN/TENDERNESS

## 2024-02-05 NOTE — PHYSICAL EXAM
INTERVENTIONAL NEPHROLOGY HISTORY & PHYSICAL       Patient Name: Deepti Christiansonjayne LEVINE 1948    Date: 2024  Time: 8:18 AM         HPI: 75 y.o. female with ESRD on HD via left brachioaxillary arteriovenous graft who presents with difficult cannulation. This is a recurrent problem over the past several weeks, as the dialysis unit is attempting to utilize different areas of the graft for cannulation. Otherwise, the graft has functioned well for dialysis. Pt is being prepared for graftogram with possible intervention today.    Pt seen and examined at bedside in CVSS this AM. Family is present. Risks and benefits of graftogram with possible intervention and intravenous conscious sedation was reviewed with the patient. The patient agrees to proceed with the intended procedure. Consents for both intravenous conscious sedation and procedure were signed and placed within the chart.       Review of Systems:  General:  No fatigue  Skin: No rashes  HEENT: No vision changes  CVS: No CP  RS: No SOB  GIT: No abdominal pain  Extremities: No swelling  Neurological:  No focal weakness  Psych: No depression    Past Medical History:   Diagnosis Date    Acid reflux     Anesthesia complication     trouble awaking    CAD (coronary artery disease)     CORONARY STENT    Chronic kidney disease, unspecified     COPD (chronic obstructive pulmonary disease)     COVID-19     week of Mothers Day 2023    Dialysis patient     [facility] tues AND sat  chair time 0630    Epigastric pain     GERD (gastroesophageal reflux disease)     GI bleed     Gout     High cholesterol     Hx of bladder cancer     Hypertension     Kidney cancer, primary, with metastasis from kidney to other site, right     Melena     Muscle weakness of extremity     bilateral lower    Pneumonia     Post-COVID chronic cough     Sciatic nerve pain     SOB (shortness of breath) on exertion     Stroke     , small memory loss with  left sided weakness      Past  Surgical History:   Procedure Laterality Date    AV FISTULA PLACEMENT Left     BACK SURGERY      BCG for cancer      CATARACT EXTRACTION W/  INTRAOCULAR LENS IMPLANT Bilateral     CHOLECYSTECTOMY      COLONOSCOPY      COLONOSCOPY, THROUGH STOMA, WITH HEMORRHAGE CONTROL  11/22/2023    Procedure: COLONOSCOPY, THROUGH STOMA, WITH HEMORRHAGE CONTROL;  Surgeon: German Cottrell MD;  Location: [facility] ENDOSCOPY;  Service: Gastroenterology;;    COLONOSCOPY, WITH POLYPECTOMY USING SNARE N/A 11/22/2023    Procedure: COLON;  Surgeon: German Cottrell MD;  Location: [facility] ENDOSCOPY;  Service: Gastroenterology;  Laterality: N/A;    CORONARY ANGIOPLASTY WITH STENT PLACEMENT      Dr. Strickland 18 years ago    DIAGNOSTIC LAPAROSCOPY N/A 07/28/2022    Procedure: LAPAROSCOPY, DIAGNOSTIC;  Surgeon: Edmund Echols Jr., MD;  Location: [facility];  Service: General;  Laterality: N/A;    DIAGNOSTIC LAPAROSCOPY N/A 05/30/2023    Procedure: LAPAROSCOPY, DIAGNOSTIC;  Surgeon: Edmund Echols Jr., MD;  Location: [facility];  Service: General;  Laterality: N/A;    ESOPHAGOGASTRODUODENOSCOPY N/A 08/25/2023    Procedure: EGD;  Surgeon: Elan Leigh MD;  Location: [facility] ENDOSCOPY;  Service: Gastroenterology;  Laterality: N/A;    ESOPHAGOGASTRODUODENOSCOPY N/A 11/22/2023    Procedure: DOUBLE;  Surgeon: German Cottrell MD;  Location: [facility] ENDOSCOPY;  Service: Gastroenterology;  Laterality: N/A;    HERNIA REPAIR      HYSTERECTOMY      LAPAROSCOPIC REVISION OF PROCEDURE INVOLVING PERITONEAL DIALYSIS CATHETER  05/30/2023    Procedure: REVISION OF PROCEDURE INVOLVING PERITONEAL DIALYSIS CATHETER, LAPAROSCOPIC;  Surgeon: Edmund Echols Jr., MD;  Location: [facility];  Service: General;;    NECK SURGERY      cervical    PERITONEAL CATHETER INSERTION      REMOVAL, CATHETER, DIALYSIS, PERITONEAL, LAPAROSCOPIC N/A 09/28/2023    Procedure: REMOVAL, CATHETER, DIALYSIS, PERITONEAL, LAPAROSCOPIC;  Surgeon: Edmund Echols  HEMA Saleh MD;  Location: [facility];  Service: General;  Laterality: N/A;  LAP VENTRAL HERNIA REPAIR NOT PERFORMED DUE TO MESH IN PLACE, NOT NEEDED     right kidney removed Right     TONSILLECTOMY        Review of patient's allergies indicates:   Allergen Reactions    Penicillins Itching and Hallucinations    Ciprofloxacin Itching    Codeine Itching      Social History     Tobacco Use    Smoking status: Every Day     Current packs/day: 1.00     Types: Cigarettes, Vaping with nicotine     Start date: 2022    Smokeless tobacco: Never   Substance Use Topics    Alcohol use: Not Currently     Alcohol/week: 2.0 standard drinks of alcohol     Types: 1 Glasses of wine, 1 Cans of beer per week     Comment: occasionally    Drug use: Never      Family History   Problem Relation Age of Onset    Cancer Mother     Heart attack Father          Current Facility-Administered Medications:     sodium chloride 0.9% flush 10 mL, 10 mL, Intravenous, PRN, Hasan, Pradip, DO    Vital Signs:  Temp:  [97.8 °F (36.6 °C)] 97.8 °F (36.6 °C)  Pulse:  [83] 83  SpO2:  [92 %] 92 %  BP: (144)/(62) 144/62     Physical Exam:  General: NAD  HEENT: NC/AT, EOMI  CVS: RRR.  RS: breathing easily.  Abdominal: Soft, NT/ND.  Extremities: No edema b/l LE  Skin: No rash, no lesions.  Neurological: No focal deficits.  Psych: Normal affect  Dialysis Access: left brachioaxillary arteriovenous graft with good thrill. Obvious areas of frequent cannulation in the middle/distal graft.    Results:    Lab Results   Component Value Date     08/25/2023    K 4.4 08/25/2023    CO2 22 (L) 08/25/2023    BUN 43.2 (H) 08/25/2023    CREATININE 4.37 (H) 08/25/2023     Lab Results   Component Value Date    WBC 9.73 08/25/2023    HGB 10.9 (L) 08/25/2023     08/25/2023    MCV 98.3 (H) 08/25/2023       Assessment and Plan:      ESRD on HD via left brachioaxillary arteriovenous graft.  Mechanical complication of AVG.  Pt with ESRD on HD via left brachioaxillary arteriovenous  [Normal Neck Lymph Nodes] : normal neck lymph nodes  [Normal Supraclavicular Lymph Nodes] : normal supraclavicular lymph nodes graft who presents today with difficult cannulation. The pt is being prepared for graftogram with possible intervention today.  - Consents obtained and placed within chart.  - Will proceed in cath lab setting today.    Please feel free to reach me with any questions.    Pradip Erwin,   Interventional Nephrology  Cell: [phone]     [Normal] : oriented to person, place and time, with appropriate affect [de-identified] : anicteric [de-identified] : S1,S2, regular rate and rhythm. No murmurs heard. [de-identified] : Clear throughout. No wheezes heard. [de-identified] : mid abdominal surgical scar

## 2024-07-26 NOTE — DISCHARGE NOTE ADULT - PATIENT PORTAL LINK FT
Condition:: Rash Please Describe Your Condition:: Feet, month, no treatment, no symptom You can access the [facility] Patient Portal, offered by [facility], by registering with the following website: [email or web address]
